# Patient Record
Sex: FEMALE | Race: WHITE | NOT HISPANIC OR LATINO | Employment: UNEMPLOYED | URBAN - METROPOLITAN AREA
[De-identification: names, ages, dates, MRNs, and addresses within clinical notes are randomized per-mention and may not be internally consistent; named-entity substitution may affect disease eponyms.]

---

## 2017-12-18 ENCOUNTER — HOSPITAL ENCOUNTER (EMERGENCY)
Facility: HOSPITAL | Age: 57
Discharge: HOME | End: 2017-12-18
Attending: EMERGENCY MEDICINE
Payer: MEDICAID

## 2017-12-18 VITALS
DIASTOLIC BLOOD PRESSURE: 83 MMHG | BODY MASS INDEX: 23.39 KG/M2 | HEART RATE: 97 BPM | TEMPERATURE: 97 F | HEIGHT: 63 IN | WEIGHT: 132 LBS | RESPIRATION RATE: 16 BRPM | SYSTOLIC BLOOD PRESSURE: 140 MMHG | OXYGEN SATURATION: 100 %

## 2017-12-18 DIAGNOSIS — J40 BRONCHITIS: Primary | ICD-10-CM

## 2017-12-18 PROCEDURE — 99283 EMERGENCY DEPT VISIT LOW MDM: CPT

## 2017-12-18 RX ORDER — ROPINIROLE 1 MG/1
1 TABLET, FILM COATED ORAL 2 TIMES DAILY
COMMUNITY
End: 2020-09-08

## 2017-12-18 RX ORDER — AZITHROMYCIN 250 MG/1
TABLET, FILM COATED ORAL
Status: DISCONTINUED
Start: 2017-12-18 | End: 2017-12-19 | Stop reason: HOSPADM

## 2017-12-18 RX ORDER — FEXOFENADINE HCL 60 MG
60 TABLET ORAL DAILY
COMMUNITY

## 2017-12-18 RX ORDER — PANTOPRAZOLE SODIUM 40 MG/1
40 TABLET, DELAYED RELEASE ORAL DAILY
COMMUNITY

## 2017-12-18 RX ORDER — NITROFURANTOIN (MACROCRYSTALS) 100 MG/1
100 CAPSULE ORAL EVERY 12 HOURS
COMMUNITY
End: 2020-09-08

## 2017-12-18 RX ORDER — GUAIFENESIN 600 MG/1
1200 TABLET, EXTENDED RELEASE ORAL 2 TIMES DAILY
COMMUNITY
End: 2023-05-04

## 2017-12-19 NOTE — ED PROVIDER NOTES
Encounter Date: 12/18/2017       History     Chief Complaint   Patient presents with    Nasal Congestion     with a cough and a sore throat for 6 days     The history is provided by the patient.   Cough   This is a new problem. The current episode started several days ago. The problem has been unchanged. The cough is productive of sputum. There has been no fever. Pertinent negatives include no chest pain, no chills, no sweats, no weight loss, no ear congestion, no ear pain, no headaches, no rhinorrhea, no sore throat, no myalgias, no shortness of breath, no wheezing and no eye redness. She has tried nothing for the symptoms. She is a smoker. Her past medical history does not include emphysema or asthma.     Review of patient's allergies indicates:   Allergen Reactions    Aspirin     Pcn [penicillins]     Sulfa (sulfonamide antibiotics)      Past Medical History:   Diagnosis Date    Anxiety     Cancer     Epilepsy     Hypertension     Renal disorder     Seizures      Past Surgical History:   Procedure Laterality Date    APPENDECTOMY      HYSTERECTOMY       No family history on file.  Social History   Substance Use Topics    Smoking status: Current Every Day Smoker     Packs/day: 2.00     Types: Cigarettes    Smokeless tobacco: Not on file    Alcohol use No     Review of Systems   Constitutional: Negative for chills and weight loss.   HENT: Negative for ear pain, rhinorrhea and sore throat.    Eyes: Negative for redness.   Respiratory: Positive for cough. Negative for shortness of breath and wheezing.    Cardiovascular: Negative for chest pain.   Gastrointestinal: Negative for abdominal pain, nausea and vomiting.   Genitourinary: Negative for enuresis and flank pain.   Musculoskeletal: Negative for myalgias, neck pain and neck stiffness.   Skin: Negative for color change, rash and wound.   Neurological: Negative for tremors, syncope, weakness and headaches.       Physical Exam     Initial Vitals  [12/18/17 1940]   BP Pulse Resp Temp SpO2   (!) 140/83 97 16 97 °F (36.1 °C) 100 %      MAP       102         Physical Exam    Nursing note and vitals reviewed.  Constitutional: She appears well-developed and well-nourished. She is not diaphoretic. No distress.   HENT:   Head: Atraumatic.   Mouth/Throat: No oropharyngeal exudate.   Eyes: EOM are normal. Pupils are equal, round, and reactive to light.   Neck: Normal range of motion. Neck supple. No JVD present.   Pulmonary/Chest: Breath sounds normal. No stridor. No respiratory distress. She has no wheezes. She has no rhonchi. She has no rales.   Abdominal: Soft. Bowel sounds are normal. She exhibits no distension. There is no tenderness. There is no rebound and no guarding.   Musculoskeletal: Normal range of motion. She exhibits no edema or tenderness.   Neurological: She is alert and oriented to person, place, and time. No sensory deficit.   Skin: No rash noted.         ED Course   Procedures  Labs Reviewed - No data to display                            ED Course      Clinical Impression:   The encounter diagnosis was Bronchitis.    Disposition:   Disposition: Discharged  Condition: Stable                        Yan Ervin MD  12/19/17 0049

## 2017-12-19 NOTE — ED TRIAGE NOTES
57 y.o. female presents to ER ED 02/ED 02B   Chief Complaint   Patient presents with    Nasal Congestion     with a cough and a sore throat for 6 days   . No acute distress noted.

## 2019-01-04 ENCOUNTER — HOSPITAL ENCOUNTER (OUTPATIENT)
Dept: SLEEP MEDICINE | Facility: HOSPITAL | Age: 59
Discharge: HOME | End: 2019-01-04
Attending: FAMILY MEDICINE
Payer: MEDICARE

## 2019-01-04 DIAGNOSIS — G47.9 REPETITIVE INTRUSIONS OF SLEEP: ICD-10-CM

## 2019-01-04 PROCEDURE — 95810 POLYSOM 6/> YRS 4/> PARAM: CPT

## 2019-02-02 ENCOUNTER — HOSPITAL ENCOUNTER (OUTPATIENT)
Dept: SLEEP MEDICINE | Facility: HOSPITAL | Age: 59
Discharge: HOME | End: 2019-02-02
Attending: FAMILY MEDICINE
Payer: MEDICARE

## 2019-02-02 DIAGNOSIS — G47.9 REPETITIVE INTRUSIONS OF SLEEP: ICD-10-CM

## 2019-02-02 PROCEDURE — 95811 POLYSOM 6/>YRS CPAP 4/> PARM: CPT

## 2019-10-26 ENCOUNTER — NURSE TRIAGE (OUTPATIENT)
Dept: ADMINISTRATIVE | Facility: CLINIC | Age: 59
End: 2019-10-26

## 2019-10-26 NOTE — TELEPHONE ENCOUNTER
Reason for Disposition   Sounds like a life-threatening emergency to the triager    Additional Information   Negative: Difficult to awaken or acting confused (e.g., disoriented, slurred speech)   Negative: Shock suspected (e.g., cold/pale/clammy skin, too weak to stand, low BP, rapid pulse)    Protocols used: DIARRHEA-A-  LM at 337pm at  [phone]  Sick since 10/23 with V/D, weak, cant hold down anything. feeling like about to pass out. abd cramps, [id number]. rec EMS. Pt agrees. Call back with questions

## 2020-09-08 ENCOUNTER — OFFICE VISIT (OUTPATIENT)
Dept: NEUROLOGY | Facility: CLINIC | Age: 60
End: 2020-09-08
Payer: MEDICARE

## 2020-09-08 VITALS
WEIGHT: 112.63 LBS | RESPIRATION RATE: 16 BRPM | TEMPERATURE: 98 F | BODY MASS INDEX: 19.96 KG/M2 | HEART RATE: 85 BPM | DIASTOLIC BLOOD PRESSURE: 70 MMHG | SYSTOLIC BLOOD PRESSURE: 122 MMHG | HEIGHT: 63 IN

## 2020-09-08 DIAGNOSIS — I10 ESSENTIAL HYPERTENSION: ICD-10-CM

## 2020-09-08 DIAGNOSIS — R90.82 WHITE MATTER ABNORMALITY ON MRI OF BRAIN: Primary | ICD-10-CM

## 2020-09-08 DIAGNOSIS — R41.3 MEMORY LOSS: ICD-10-CM

## 2020-09-08 DIAGNOSIS — F17.200 SMOKER: ICD-10-CM

## 2020-09-08 DIAGNOSIS — Z86.69 HISTORY OF SEIZURES AS A CHILD: ICD-10-CM

## 2020-09-08 PROCEDURE — 99999 PR STA SHADOW: CPT | Mod: PBBFAC,,, | Performed by: PSYCHIATRY & NEUROLOGY

## 2020-09-08 PROCEDURE — 99999 PR PBB SHADOW E&M-EST. PATIENT-LVL IV: CPT | Mod: PBBFAC,,, | Performed by: PSYCHIATRY & NEUROLOGY

## 2020-09-08 PROCEDURE — 99999 PR STA SHADOW: ICD-10-PCS | Mod: PBBFAC,,, | Performed by: PSYCHIATRY & NEUROLOGY

## 2020-09-08 PROCEDURE — 99204 OFFICE O/P NEW MOD 45 MIN: CPT | Mod: S$PBB | Performed by: PSYCHIATRY & NEUROLOGY

## 2020-09-08 PROCEDURE — 99214 OFFICE O/P EST MOD 30 MIN: CPT | Mod: PBBFAC | Performed by: PSYCHIATRY & NEUROLOGY

## 2020-09-08 RX ORDER — MELOXICAM 15 MG/1
15 TABLET ORAL DAILY PRN
COMMUNITY
End: 2023-05-04

## 2020-09-08 RX ORDER — AMITRIPTYLINE HYDROCHLORIDE 25 MG/1
25 TABLET, FILM COATED ORAL NIGHTLY PRN
COMMUNITY
End: 2023-05-04

## 2020-09-08 RX ORDER — AMLODIPINE BESYLATE 5 MG/1
5 TABLET ORAL NIGHTLY
COMMUNITY

## 2020-09-08 RX ORDER — CYCLOBENZAPRINE HCL 10 MG
10 TABLET ORAL 3 TIMES DAILY PRN
COMMUNITY
End: 2023-05-04

## 2020-09-08 RX ORDER — CYCLOSPORINE 0.5 MG/ML
1 EMULSION OPHTHALMIC 2 TIMES DAILY
COMMUNITY

## 2020-09-08 RX ORDER — LISINOPRIL 40 MG/1
40 TABLET ORAL DAILY
COMMUNITY

## 2020-09-08 RX ORDER — ERYTHROMYCIN 5 MG/G
1 OINTMENT OPHTHALMIC 3 TIMES DAILY
COMMUNITY

## 2020-09-08 NOTE — PROGRESS NOTES
"Consult from Dr Frankel    HPI: Melanie Armenta is a 59 y.o. female who states she is here to inquire about " If I really need to be on these blood thinners."  By this she means Plavix.  She is not NOAC, coumaind.  She was placed no Plavix after dizziness episode in 2014 where chronic white matter ischemic changes. She has an ASA allergy.  She does not like bruising associated with Plavix- she could not finish a tatoo due to bleeding.    She states her memory is not bad, but notes some mild forgetfulness non daily (not even weekly) and not very bothersome. 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 PT was recommended at that time along with smoking cessation and repeat MRI brain to monitor white matter changes but she failed to follow up    Vertigo never recurred after PT in 2014    History of childhood seizure but none since     Smoker still but is tapering.   Had stress test prior, no abnormalities (chronic chest pain from anxiety noted)      Review of Systems   Constitutional: Negative for fever.   HENT: Negative for nosebleeds.    Eyes: Negative for double vision.   Respiratory: Negative for hemoptysis.    Cardiovascular: Negative for leg swelling.   Gastrointestinal: Negative for blood in stool.   Genitourinary: Negative for hematuria.   Musculoskeletal: Negative for falls.   Skin: Negative for rash.   Neurological: Negative for seizures.   Psychiatric/Behavioral: Negative for depression. The patient does not have insomnia.          I have reviewed all of this patient's past medical and surgical histories as well as family and social histories and active allergies and medications as documented in the electronic medical record.        Exam:  Gen Appearance, well developed/nourished in no apparent distress  CV: 2+ distal pulses with no " edema or swelling  Neuro:  MS: Awake, alert, oriented to place, person, time, situation. Sustains attention. Recent/remote memory intact, Language is full to spontaneous speech/repetition/naming/comprehension. Fund of Knowledge is full  CN: Optic discs are flat with normal vasculature, PERRL, Extraoccular movements and visual fields are full. Normal facial sensation and strength, Hearing symmetric, Tongue and Palate are midline and strong. Shoulder Shrug symmetric and strong.  Motor: Normal bulk, tone, no abnormal movements. 5/5 strength bilateral upper/lower extremities with 2+ reflexes and bilateral plantar response  Sensory: symmetric to light touch, pain, temp, and vibration/proprioception. Romberg negative  Cerebellar: Finger-nose,Heal-shin, Rapid alternating movements intact  Gait: Normal stance, no ataxia  Minimal bruising on exam    Imagin MRI brain: Multiple punctate foci of T2/flair signal abnormality seen throughout the supratentorial white matter without corresponding diffusion were strictured or postcontrast enhancement.  This finding is greater than expected for the patient's age.  Primary   differential considerations would include chronic microvascular ischemic change.  Demyelinating disease process cannot be entirely excluded.  If prior imaging is available, it can be submitted for comparison purposes to determine the stability of these   findings.  Alternatively, consider a short term interval follow up examination.   2014 MRA brainNo evidence for focal stenosis or aneurysm.     Diminutive caliber of the bilateral vertebral arteries and basilar artery with essentially fetal circulation involving the bilateral posterior cerebral arteries.  Consideration for the possibility of vertebrobasilar syndrome in this patient with   dizziness..         Assessment/Plan: Melanie Armenta is a 59 y.o. female known to me in 2014 for vertigo and incidentally found chronic white matter ischemic changes by MRI  "brain     I recommend:   1. 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 PT was recommended at that time along with smoking cessation and repeat MRI brain to monitor white matter changes but she failed to follow up. No need for follow up at this time as this was incidentally found and patient does not have any symptoms referred to these changes over many years which are likely related to smoking, HTN.   -She reportedly has an ASA allergy  -continue plavix given her smoking (she is trying to taper) and HTN and white matter changes (brusing is minimal, benefits are more than risks)  -Vertigo never recurred after PT in 2014    2. Noted  childhood history of reportedly "petite mal" seizures /none since age 18.     3. Reporting rare memory loss likely age related. No further work up or treatment needed at this time unless work  (RTC if so)    RTC PRN  CC:   Dr Frankel          "

## 2021-09-28 ENCOUNTER — HOSPITAL ENCOUNTER (EMERGENCY)
Facility: HOSPITAL | Age: 61
Discharge: HOME | End: 2021-09-28
Attending: SURGERY
Payer: MEDICARE

## 2021-09-28 VITALS
HEART RATE: 105 BPM | TEMPERATURE: 98 F | OXYGEN SATURATION: 97 % | RESPIRATION RATE: 16 BRPM | WEIGHT: 127 LBS | SYSTOLIC BLOOD PRESSURE: 140 MMHG | HEIGHT: 63 IN | DIASTOLIC BLOOD PRESSURE: 72 MMHG | BODY MASS INDEX: 22.5 KG/M2

## 2021-09-28 DIAGNOSIS — K29.70 GASTRITIS, PRESENCE OF BLEEDING UNSPECIFIED, UNSPECIFIED CHRONICITY, UNSPECIFIED GASTRITIS TYPE: Primary | ICD-10-CM

## 2021-09-28 DIAGNOSIS — R10.13 EPIGASTRIC ABDOMINAL PAIN: ICD-10-CM

## 2021-09-28 DIAGNOSIS — R10.9 ABDOMINAL PAIN: ICD-10-CM

## 2021-09-28 LAB
ALBUMIN SERPL BCP-MCNC: 3.9 G/DL (ref 3.5–5.2)
ALP SERPL-CCNC: 94 U/L (ref 55–135)
ALT SERPL W/O P-5'-P-CCNC: 12 U/L (ref 10–44)
ANION GAP SERPL CALC-SCNC: 10 MMOL/L (ref 8–16)
AST SERPL-CCNC: 13 U/L (ref 10–40)
BACTERIA #/AREA URNS HPF: ABNORMAL /HPF
BASOPHILS # BLD AUTO: 0.08 K/UL (ref 0–0.2)
BASOPHILS NFR BLD: 0.7 % (ref 0–1.9)
BILIRUB SERPL-MCNC: 0.7 MG/DL (ref 0.1–1)
BILIRUB UR QL STRIP: NEGATIVE
BUN SERPL-MCNC: 11 MG/DL (ref 8–23)
CALCIUM SERPL-MCNC: 9.8 MG/DL (ref 8.7–10.5)
CHLORIDE SERPL-SCNC: 107 MMOL/L (ref 95–110)
CK MB SERPL-MCNC: 0.5 NG/ML (ref 0.1–6.5)
CK MB SERPL-RTO: 1.1 % (ref 0–5)
CK SERPL-CCNC: 45 U/L (ref 20–180)
CK SERPL-CCNC: 45 U/L (ref 20–180)
CLARITY UR: CLEAR
CO2 SERPL-SCNC: 24 MMOL/L (ref 23–29)
COLOR UR: YELLOW
CREAT SERPL-MCNC: 0.9 MG/DL (ref 0.5–1.4)
DIFFERENTIAL METHOD: NORMAL
EOSINOPHIL # BLD AUTO: 0.2 K/UL (ref 0–0.5)
EOSINOPHIL NFR BLD: 1.9 % (ref 0–8)
ERYTHROCYTE [DISTWIDTH] IN BLOOD BY AUTOMATED COUNT: 13.2 % (ref 11.5–14.5)
EST. GFR  (AFRICAN AMERICAN): >60 ML/MIN/1.73 M^2
EST. GFR  (NON AFRICAN AMERICAN): >60 ML/MIN/1.73 M^2
GLUCOSE SERPL-MCNC: 110 MG/DL (ref 70–110)
GLUCOSE UR QL STRIP: NEGATIVE
HCT VFR BLD AUTO: 43.1 % (ref 37–48.5)
HGB BLD-MCNC: 14.1 G/DL (ref 12–16)
HGB UR QL STRIP: NEGATIVE
IMM GRANULOCYTES # BLD AUTO: 0.03 K/UL (ref 0–0.04)
IMM GRANULOCYTES NFR BLD AUTO: 0.3 % (ref 0–0.5)
KETONES UR QL STRIP: NEGATIVE
LEUKOCYTE ESTERASE UR QL STRIP: ABNORMAL
LIPASE SERPL-CCNC: 11 U/L (ref 4–60)
LYMPHOCYTES # BLD AUTO: 3.6 K/UL (ref 1–4.8)
LYMPHOCYTES NFR BLD: 32.8 % (ref 18–48)
MCH RBC QN AUTO: 29.6 PG (ref 27–31)
MCHC RBC AUTO-ENTMCNC: 32.7 G/DL (ref 32–36)
MCV RBC AUTO: 90 FL (ref 82–98)
MICROSCOPIC COMMENT: ABNORMAL
MONOCYTES # BLD AUTO: 0.8 K/UL (ref 0.3–1)
MONOCYTES NFR BLD: 7.1 % (ref 4–15)
NEUTROPHILS # BLD AUTO: 6.2 K/UL (ref 1.8–7.7)
NEUTROPHILS NFR BLD: 57.2 % (ref 38–73)
NITRITE UR QL STRIP: NEGATIVE
NRBC BLD-RTO: 0 /100 WBC
PH UR STRIP: 5 [PH] (ref 5–8)
PLATELET # BLD AUTO: 340 K/UL (ref 150–450)
PMV BLD AUTO: 9.3 FL (ref 9.2–12.9)
POTASSIUM SERPL-SCNC: 4.1 MMOL/L (ref 3.5–5.1)
PROT SERPL-MCNC: 8.4 G/DL (ref 6–8.4)
PROT UR QL STRIP: NEGATIVE
RBC # BLD AUTO: 4.77 M/UL (ref 4–5.4)
RBC #/AREA URNS HPF: 2 /HPF (ref 0–4)
SODIUM SERPL-SCNC: 141 MMOL/L (ref 136–145)
SP GR UR STRIP: >=1.03 (ref 1–1.03)
SQUAMOUS #/AREA URNS HPF: 4 /HPF
TROPONIN I SERPL DL<=0.01 NG/ML-MCNC: <0.006 NG/ML (ref 0–0.03)
URN SPEC COLLECT METH UR: ABNORMAL
UROBILINOGEN UR STRIP-ACNC: NEGATIVE EU/DL
WBC # BLD AUTO: 10.84 K/UL (ref 3.9–12.7)
WBC #/AREA URNS HPF: 4 /HPF (ref 0–5)

## 2021-09-28 PROCEDURE — 82553 CREATINE MB FRACTION: CPT | Performed by: SURGERY

## 2021-09-28 PROCEDURE — 99284 EMERGENCY DEPT VISIT MOD MDM: CPT | Mod: 25

## 2021-09-28 PROCEDURE — 25000003 PHARM REV CODE 250: Performed by: SURGERY

## 2021-09-28 PROCEDURE — 36415 COLL VENOUS BLD VENIPUNCTURE: CPT | Performed by: SURGERY

## 2021-09-28 PROCEDURE — 80053 COMPREHEN METABOLIC PANEL: CPT | Performed by: SURGERY

## 2021-09-28 PROCEDURE — 81000 URINALYSIS NONAUTO W/SCOPE: CPT | Performed by: SURGERY

## 2021-09-28 PROCEDURE — 93005 ELECTROCARDIOGRAM TRACING: CPT

## 2021-09-28 PROCEDURE — 25500020 PHARM REV CODE 255: Performed by: SURGERY

## 2021-09-28 PROCEDURE — 96361 HYDRATE IV INFUSION ADD-ON: CPT

## 2021-09-28 PROCEDURE — 93010 ELECTROCARDIOGRAM REPORT: CPT | Mod: ,,, | Performed by: INTERNAL MEDICINE

## 2021-09-28 PROCEDURE — 85025 COMPLETE CBC W/AUTO DIFF WBC: CPT | Performed by: SURGERY

## 2021-09-28 PROCEDURE — 93010 EKG 12-LEAD: ICD-10-PCS | Mod: ,,, | Performed by: INTERNAL MEDICINE

## 2021-09-28 PROCEDURE — 96360 HYDRATION IV INFUSION INIT: CPT

## 2021-09-28 PROCEDURE — 83690 ASSAY OF LIPASE: CPT | Performed by: SURGERY

## 2021-09-28 PROCEDURE — 84484 ASSAY OF TROPONIN QUANT: CPT | Performed by: SURGERY

## 2021-09-28 RX ORDER — ONDANSETRON 4 MG/1
4 TABLET, ORALLY DISINTEGRATING ORAL EVERY 8 HOURS PRN
Qty: 9 TABLET | Refills: 0 | Status: SHIPPED | OUTPATIENT
Start: 2021-09-28 | End: 2021-10-01

## 2021-09-28 RX ORDER — SUCRALFATE 1 G/1
1 TABLET ORAL
Qty: 120 TABLET | Refills: 0 | Status: SHIPPED | OUTPATIENT
Start: 2021-09-28 | End: 2023-05-04

## 2021-09-28 RX ADMIN — IOHEXOL 75 ML: 350 INJECTION, SOLUTION INTRAVENOUS at 02:09

## 2021-09-28 RX ADMIN — SODIUM CHLORIDE 1000 ML: 0.9 INJECTION, SOLUTION INTRAVENOUS at 02:09

## 2021-09-28 RX ADMIN — SODIUM CHLORIDE 1000 ML: 0.9 INJECTION, SOLUTION INTRAVENOUS at 01:09

## 2023-02-25 ENCOUNTER — HOSPITAL ENCOUNTER (EMERGENCY)
Facility: HOSPITAL | Age: 63
Discharge: HOME | End: 2023-02-25
Attending: SURGERY
Payer: MEDICARE

## 2023-02-25 VITALS
BODY MASS INDEX: 21.6 KG/M2 | DIASTOLIC BLOOD PRESSURE: 70 MMHG | SYSTOLIC BLOOD PRESSURE: 142 MMHG | OXYGEN SATURATION: 96 % | RESPIRATION RATE: 16 BRPM | TEMPERATURE: 98 F | WEIGHT: 121.94 LBS | HEART RATE: 103 BPM

## 2023-02-25 DIAGNOSIS — L02.91 ABSCESS: Primary | ICD-10-CM

## 2023-02-25 DIAGNOSIS — R50.9 FEVER: ICD-10-CM

## 2023-02-25 LAB
ALBUMIN SERPL BCP-MCNC: 4.3 G/DL (ref 3.5–5.2)
ALP SERPL-CCNC: 104 U/L (ref 55–135)
ALT SERPL W/O P-5'-P-CCNC: 10 U/L (ref 10–44)
ANION GAP SERPL CALC-SCNC: 12 MMOL/L (ref 8–16)
AST SERPL-CCNC: 14 U/L (ref 10–40)
BASOPHILS # BLD AUTO: 0.05 K/UL (ref 0–0.2)
BASOPHILS NFR BLD: 0.5 % (ref 0–1.9)
BILIRUB SERPL-MCNC: 0.7 MG/DL (ref 0.1–1)
BILIRUB UR QL STRIP: NEGATIVE
BUN SERPL-MCNC: 11 MG/DL (ref 8–23)
CALCIUM SERPL-MCNC: 10.6 MG/DL (ref 8.7–10.5)
CHLORIDE SERPL-SCNC: 103 MMOL/L (ref 95–110)
CLARITY UR: CLEAR
CO2 SERPL-SCNC: 27 MMOL/L (ref 23–29)
COLOR UR: YELLOW
CREAT SERPL-MCNC: 0.9 MG/DL (ref 0.5–1.4)
DIFFERENTIAL METHOD: ABNORMAL
EOSINOPHIL # BLD AUTO: 0.1 K/UL (ref 0–0.5)
EOSINOPHIL NFR BLD: 1 % (ref 0–8)
ERYTHROCYTE [DISTWIDTH] IN BLOOD BY AUTOMATED COUNT: 12.9 % (ref 11.5–14.5)
EST. GFR  (NO RACE VARIABLE): >60 ML/MIN/1.73 M^2
GLUCOSE SERPL-MCNC: 102 MG/DL (ref 70–110)
GLUCOSE UR QL STRIP: NEGATIVE
HCT VFR BLD AUTO: 44.2 % (ref 37–48.5)
HGB BLD-MCNC: 14.3 G/DL (ref 12–16)
HGB UR QL STRIP: ABNORMAL
IMM GRANULOCYTES # BLD AUTO: 0.03 K/UL (ref 0–0.04)
IMM GRANULOCYTES NFR BLD AUTO: 0.3 % (ref 0–0.5)
KETONES UR QL STRIP: NEGATIVE
LACTATE SERPL-SCNC: 0.9 MMOL/L (ref 0.5–2.2)
LEUKOCYTE ESTERASE UR QL STRIP: NEGATIVE
LYMPHOCYTES # BLD AUTO: 2.6 K/UL (ref 1–4.8)
LYMPHOCYTES NFR BLD: 24.8 % (ref 18–48)
MCH RBC QN AUTO: 29.5 PG (ref 27–31)
MCHC RBC AUTO-ENTMCNC: 32.4 G/DL (ref 32–36)
MCV RBC AUTO: 91 FL (ref 82–98)
MONOCYTES # BLD AUTO: 0.9 K/UL (ref 0.3–1)
MONOCYTES NFR BLD: 8.3 % (ref 4–15)
NEUTROPHILS # BLD AUTO: 6.9 K/UL (ref 1.8–7.7)
NEUTROPHILS NFR BLD: 65.1 % (ref 38–73)
NITRITE UR QL STRIP: NEGATIVE
NRBC BLD-RTO: 0 /100 WBC
PH UR STRIP: 7 [PH] (ref 5–8)
PLATELET # BLD AUTO: 291 K/UL (ref 150–450)
PMV BLD AUTO: 8.9 FL (ref 9.2–12.9)
POTASSIUM SERPL-SCNC: 4.2 MMOL/L (ref 3.5–5.1)
PROT SERPL-MCNC: 8.4 G/DL (ref 6–8.4)
PROT UR QL STRIP: NEGATIVE
RBC # BLD AUTO: 4.84 M/UL (ref 4–5.4)
SODIUM SERPL-SCNC: 142 MMOL/L (ref 136–145)
SP GR UR STRIP: 1.01 (ref 1–1.03)
TROPONIN I SERPL DL<=0.01 NG/ML-MCNC: <0.006 NG/ML (ref 0–0.03)
URN SPEC COLLECT METH UR: ABNORMAL
UROBILINOGEN UR STRIP-ACNC: NEGATIVE EU/DL
WBC # BLD AUTO: 10.62 K/UL (ref 3.9–12.7)

## 2023-02-25 PROCEDURE — 96375 TX/PRO/DX INJ NEW DRUG ADDON: CPT

## 2023-02-25 PROCEDURE — 87040 BLOOD CULTURE FOR BACTERIA: CPT | Mod: 59

## 2023-02-25 PROCEDURE — 93010 ELECTROCARDIOGRAM REPORT: CPT | Mod: ,,, | Performed by: INTERNAL MEDICINE

## 2023-02-25 PROCEDURE — 93010 EKG 12-LEAD: ICD-10-PCS | Mod: ,,, | Performed by: INTERNAL MEDICINE

## 2023-02-25 PROCEDURE — 80053 COMPREHEN METABOLIC PANEL: CPT

## 2023-02-25 PROCEDURE — 67700 BLEPHAROTOMY DRG ABSC EYELID: CPT | Mod: E3

## 2023-02-25 PROCEDURE — 81003 URINALYSIS AUTO W/O SCOPE: CPT

## 2023-02-25 PROCEDURE — 25000003 PHARM REV CODE 250

## 2023-02-25 PROCEDURE — 10060 I&D ABSCESS SIMPLE/SINGLE: CPT

## 2023-02-25 PROCEDURE — 99285 EMERGENCY DEPT VISIT HI MDM: CPT | Mod: 25

## 2023-02-25 PROCEDURE — 83605 ASSAY OF LACTIC ACID: CPT

## 2023-02-25 PROCEDURE — 85025 COMPLETE CBC W/AUTO DIFF WBC: CPT

## 2023-02-25 PROCEDURE — 96365 THER/PROPH/DIAG IV INF INIT: CPT | Mod: 59

## 2023-02-25 PROCEDURE — 84484 ASSAY OF TROPONIN QUANT: CPT

## 2023-02-25 PROCEDURE — 25500020 PHARM REV CODE 255: Performed by: SURGERY

## 2023-02-25 PROCEDURE — 63600175 PHARM REV CODE 636 W HCPCS

## 2023-02-25 PROCEDURE — 93005 ELECTROCARDIOGRAM TRACING: CPT

## 2023-02-25 RX ORDER — MORPHINE SULFATE 2 MG/ML
4 INJECTION, SOLUTION INTRAMUSCULAR; INTRAVENOUS
Status: COMPLETED | OUTPATIENT
Start: 2023-02-25 | End: 2023-02-25

## 2023-02-25 RX ORDER — LIDOCAINE HYDROCHLORIDE 10 MG/ML
5 INJECTION, SOLUTION EPIDURAL; INFILTRATION; INTRACAUDAL; PERINEURAL
Status: COMPLETED | OUTPATIENT
Start: 2023-02-25 | End: 2023-02-25

## 2023-02-25 RX ADMIN — LIDOCAINE HYDROCHLORIDE 50 MG: 10 INJECTION, SOLUTION EPIDURAL; INFILTRATION; INTRACAUDAL; PERINEURAL at 03:02

## 2023-02-25 RX ADMIN — IOHEXOL 75 ML: 350 INJECTION, SOLUTION INTRAVENOUS at 01:02

## 2023-02-25 RX ADMIN — MORPHINE SULFATE 4 MG: 2 INJECTION, SOLUTION INTRAMUSCULAR; INTRAVENOUS at 03:02

## 2023-02-25 RX ADMIN — PROMETHAZINE HYDROCHLORIDE 12.5 MG: 25 INJECTION INTRAMUSCULAR; INTRAVENOUS at 03:02

## 2023-02-25 NOTE — ED TRIAGE NOTES
62 y.o. female presents to ER Room/bed info not found   Chief Complaint   Patient presents with    Eye Pain     R eye - pt reports she was seen by ZOHRA Turk @ [facility]. Dx with Acute Dacryocystitis  Pt states she was advised to come to ED to be evaluated. States it was drained yesterday. Currently taking Doxy for abx   Pt reports fever 101 at 10:30. Pt took tylenol for fever.  No acute distress noted.

## 2023-02-25 NOTE — ED PROVIDER NOTES
Encounter Date: 2/25/2023       History     Chief Complaint   Patient presents with    Eye Pain     R eye - pt reports she was seen by ZOHRA Turk @ [facility].  with Acute Dacryocystitis  Pt states she was advised to come to ED to be evaluated. States it was drained yesterday. Currently taking Doxy for abx     This note is dictated on M*Modal word recognition program.  There are word recognition mistakes and grammatical errors that are occasionally missed on review.     Melanie Armenta is a 62 y.o. female presents to ER today with complaints of orbital cellulitis to her right eye  She saw eye doctor at [facility] yesterday.  Patient reports she is been running fever on and off for the last 4 days.  She reports her eye doctor advised her to come to ER to make sure she is not septic.  Patient also reports a mild cough and has some chest pressure over the last week.  Patient has a plan to follow-up with the eye specialist in [place] next week to have her procedure done to help clear up this orbital cellulitis.  Patient reports the abscess to her right eye was drained yesterday by eye doctor however filled back up overnight    The history is provided by the patient.   Review of patient's allergies indicates:   Allergen Reactions    Aspirin     Azithromycin     Cephalexin Other (See Comments)    Clonazepam Other (See Comments)    Gabapentin     Hydrocodone-acetaminophen Other (See Comments)    Pcn [penicillins]     Pramipexole     Ropinirole Other (See Comments) and Hallucinations    Sertraline     Sulfa (sulfonamide antibiotics)     Tramadol-acetaminophen     Ciprofloxacin Nausea Only     Past Medical History:   Diagnosis Date    Anxiety     Cancer     Cataract     bilateral     COVID-19     Epilepsy     Hypertension     Renal disorder     Seizures      Past Surgical History:   Procedure Laterality Date    APPENDECTOMY      HYSTERECTOMY       History reviewed. No pertinent family history.  Social  History     Tobacco Use    Smoking status: Every Day     Packs/day: 2.00     Types: Cigarettes   Substance Use Topics    Alcohol use: No    Drug use: No     Review of Systems   Constitutional:  Positive for fatigue and fever.   HENT: Negative.     Eyes:  Positive for visual disturbance (Patient reports mild blurred vision to right eye.).        Patient has periorbital abscess to corner of right eye/bridge of nose.   Respiratory:  Positive for cough and chest tightness.    Cardiovascular: Negative.    Gastrointestinal: Negative.    Endocrine: Negative.    Genitourinary:  Positive for difficulty urinating.   Psychiatric/Behavioral: Negative.       Physical Exam     Initial Vitals [02/25/23 1122]   BP Pulse Resp Temp SpO2   (!) 142/70 103 20 97.9 °F (36.6 °C) 96 %      MAP       --         Physical Exam    Constitutional: She appears well-developed and well-nourished.   HENT:   Head: Normocephalic and atraumatic.   Right Ear: External ear normal.   Left Ear: External ear normal.   Eyes: Pupils are equal, round, and reactive to light. Right eye exhibits discharge (Patient has abscess to corner of right eye near nasal bridge that is erythemic, and indurated). Left eye exhibits no discharge.   Patient has abscess to corner of right eye   Neck: Neck supple.   Normal range of motion.  Cardiovascular:  Normal rate.           Pulmonary/Chest: Breath sounds normal. No respiratory distress. She has no wheezes.   Abdominal: Abdomen is soft. She exhibits no distension.   Musculoskeletal:         General: No tenderness or edema. Normal range of motion.      Cervical back: Normal range of motion and neck supple.     Neurological: She is oriented to person, place, and time. She displays normal reflexes. No cranial nerve deficit. GCS score is 15. GCS eye subscore is 4. GCS verbal subscore is 5. GCS motor subscore is 6.   Skin: Capillary refill takes less than 2 seconds. Abscess noted. There is erythema.   Psychiatric: She has a  normal mood and affect.         ED Course   I & D - Incision and Drainage    Date/Time: 2/25/2023 4:08 PM  Location procedure was performed: [facility] EMERGENCY DEPARTMENT  Performed by: Davie Jones NP  Authorized by: Davie Butcher MD   Type: abscess  Body area: head/neck (Abscess to superomedial aspect of right orbit)  Anesthesia: local infiltration    Anesthesia:  Local Anesthetic: lidocaine 1% without epinephrine  Scalpel size: 11  Incision type: single straight  Drainage: serosanguinous  Drainage amount: scant  Wound treatment: incision, expression of material and deloculation  Packing material: 1/4 in gauze  Complications: No  Specimens: No  Implants: No      Labs Reviewed   CBC W/ AUTO DIFFERENTIAL - Abnormal; Notable for the following components:       Result Value    MPV 8.9 (*)     All other components within normal limits   COMPREHENSIVE METABOLIC PANEL - Abnormal; Notable for the following components:    Calcium 10.6 (*)     All other components within normal limits   URINALYSIS, REFLEX TO URINE CULTURE - Abnormal; Notable for the following components:    Occult Blood UA Trace (*)     All other components within normal limits    Narrative:     Specimen Source->Urine   CULTURE, BLOOD   CULTURE, BLOOD   LACTIC ACID, PLASMA   TROPONIN I     EKG Readings: (Independently Interpreted)   Initial Reading: No STEMI. Rhythm: Normal Sinus Rhythm. Ectopy: No Ectopy. Axis: Normal. Clinical Impression: Normal Sinus Rhythm     Imaging Results              X-Ray Chest 1 View (Final result)  Result time 02/25/23 14:24:28      Final result by Guerita Blair MD (02/25/23 14:24:28)                   Impression:      No acute abnormality.      Electronically signed by: Guerita Blair  Date:    02/25/2023  Time:    14:24               Narrative:    EXAMINATION:  XR CHEST 1 VIEW    CLINICAL HISTORY:  Fever, unspecified    TECHNIQUE:  Single frontal view of the chest was  performed.    COMPARISON:  12/18/2017    FINDINGS:  Cardiomediastinal silhouette is within normal limits.  There is chronic scarring in the right mid lung and at the left lung base.  Lungs are otherwise clear.  There is no pleural effusion or pneumothorax.                                       CT Maxillofacial With Contrast (Final result)  Result time 02/25/23 14:23:12      Final result by Guerita Blair MD (02/25/23 14:23:12)                   Impression:      Superficial abscess in the superomedial aspect of the right orbit.    Mild sinus disease.      Electronically signed by: Guerita Blair  Date:    02/25/2023  Time:    14:23               Narrative:    EXAMINATION:  CT MAXILLOFACIAL WITH CONTRAST    CLINICAL HISTORY:  Maxillary/facial abscess;    TECHNIQUE:  Following IV administration of 75 cc of Omnipaque 350, CT of the orbits was performed.    COMPARISON:  Head CT 08/20/2014    FINDINGS:  At the supero medial aspect of the right orbit there is a superficial 15 x 14 x 24 mm fluid collection with rim enhancement compatible with an abscess.  This is extraconal and likely preseptal.  The retro bulbar soft tissues are normal in appearance.  The collection abuts the globe, but the globe is normal in appearance.    The visualized intracranial structures and the left orbit are normal.    No significant findings are noted in the visualized soft tissues of the neck.    There is mild mucosal thickening consistent with sinusitis in the right maxillary antrum, the left side of the frontal sinus and the anterior ethmoid air cells.    There is no bone destruction or facial bone fracture.                                       Medications   iohexoL (OMNIPAQUE 350) injection 75 mL (75 mLs Intravenous Given 2/25/23 1339)   morphine injection 4 mg (4 mg Intravenous Given 2/25/23 1506)   promethazine (PHENERGAN) 12.5 mg in dextrose 5 % (D5W) 50 mL IVPB (12.5 mg Intravenous New Bag 2/25/23 1513)   LIDOcaine (PF) 10  mg/ml (1%) injection 50 mg (50 mg Infiltration Given 2/25/23 4740)     Medical Decision Making:   Differential Diagnosis:   Orbital abscess, orbital cellulitis, sepsis  Clinical Tests:   Lab Tests: Ordered and Reviewed  Radiological Study: Ordered and Reviewed  Medical Tests: Ordered and Reviewed  ED Management:  CT maxillofacial shoulder superficial abscess to right eye.    CBC and CMP showed no acute metabolic derangement   Urinalysis reveals no acute urinary tract infection   Blood cultures are pending   Chest x-ray reveals no acute cardiopulmonary abnormality   Patient's shows no signs of being toxic or septic at this time.  I&D was performed to right eye abscess patient tolerated procedure well.  See procedure note.    Patient instructed to continue doxycycline and to follow-up with eye doctor as previously scheduled.  Patient stable at time of discharge in no acute distress.  No life-threatening illnesses were found during ER visit today.  Patient was instructed to follow-up with PCP or other recommended specialist within the next 48-72 hours.  Patient was instructed to return to ER immediately for any worsening or concerning symptoms.  All discharge instructions discussed with patient, and patient agrees to comply with discharge instructions given today.                         Clinical Impression:   Final diagnoses:  [R50.9] Fever  [L02.91] Abscess (Primary)        ED Disposition Condition    Discharge Stable          ED Prescriptions    None       Follow-up Information       Follow up With Specialties Details Why Contact Info    [facility]  Schedule an appointment as soon as possible for a visit in 2 days  [address]  [facility] [id number]             Davie Jones NP  02/25/23 [id number]

## 2023-03-02 LAB
BACTERIA BLD CULT: NORMAL
BACTERIA BLD CULT: NORMAL

## 2023-04-19 ENCOUNTER — TELEPHONE (OUTPATIENT)
Dept: NEUROLOGY | Facility: CLINIC | Age: 63
End: 2023-04-19
Payer: MEDICARE

## 2023-04-19 NOTE — TELEPHONE ENCOUNTER
----- Message from Francheska Albert sent at 2023  9:11 AM CDT -----  Contact: self  Melanie Armenta  MRN: [id number]  : 1960  PCP: Valery Howell  Home Phone      [phone]  Work Phone      Not on file.  Mobile          [phone]      MESSAGE: Have question and concern having surgery May 5th and asking if she can discontinue her clopidogrel until after her surgery    Phone [phone]

## 2023-12-02 ENCOUNTER — NURSE TRIAGE (OUTPATIENT)
Dept: ADMINISTRATIVE | Facility: CLINIC | Age: 63
End: 2023-12-02
Payer: MEDICARE

## 2023-12-02 ENCOUNTER — HOSPITAL ENCOUNTER (EMERGENCY)
Facility: HOSPITAL | Age: 63
Discharge: HOME | End: 2023-12-03
Attending: SURGERY
Payer: MEDICARE

## 2023-12-02 DIAGNOSIS — R10.9 FLANK PAIN: Primary | ICD-10-CM

## 2023-12-02 DIAGNOSIS — N30.00 ACUTE CYSTITIS WITHOUT HEMATURIA: ICD-10-CM

## 2023-12-02 LAB
ALBUMIN SERPL BCP-MCNC: 4.2 G/DL (ref 3.5–5.2)
ALP SERPL-CCNC: 89 U/L (ref 55–135)
ALT SERPL W/O P-5'-P-CCNC: 8 U/L (ref 10–44)
ANION GAP SERPL CALC-SCNC: 9 MMOL/L (ref 8–16)
AST SERPL-CCNC: 13 U/L (ref 10–40)
BACTERIA #/AREA URNS HPF: ABNORMAL /HPF
BASOPHILS # BLD AUTO: 0.06 K/UL (ref 0–0.2)
BASOPHILS NFR BLD: 0.5 % (ref 0–1.9)
BILIRUB SERPL-MCNC: 0.8 MG/DL (ref 0.1–1)
BILIRUB UR QL STRIP: NEGATIVE
BUN SERPL-MCNC: 16 MG/DL (ref 8–23)
CALCIUM SERPL-MCNC: 9.7 MG/DL (ref 8.7–10.5)
CHLORIDE SERPL-SCNC: 105 MMOL/L (ref 95–110)
CLARITY UR: CLEAR
CO2 SERPL-SCNC: 27 MMOL/L (ref 23–29)
COLOR UR: YELLOW
CREAT SERPL-MCNC: 1.7 MG/DL (ref 0.5–1.4)
DIFFERENTIAL METHOD: ABNORMAL
EOSINOPHIL # BLD AUTO: 0.2 K/UL (ref 0–0.5)
EOSINOPHIL NFR BLD: 1.5 % (ref 0–8)
ERYTHROCYTE [DISTWIDTH] IN BLOOD BY AUTOMATED COUNT: 12.9 % (ref 11.5–14.5)
EST. GFR  (NO RACE VARIABLE): 33 ML/MIN/1.73 M^2
GLUCOSE SERPL-MCNC: 105 MG/DL (ref 70–110)
GLUCOSE UR QL STRIP: NEGATIVE
HCT VFR BLD AUTO: 40 % (ref 37–48.5)
HGB BLD-MCNC: 13.1 G/DL (ref 12–16)
HGB UR QL STRIP: ABNORMAL
IMM GRANULOCYTES # BLD AUTO: 0.04 K/UL (ref 0–0.04)
IMM GRANULOCYTES NFR BLD AUTO: 0.3 % (ref 0–0.5)
KETONES UR QL STRIP: NEGATIVE
LACTATE SERPL-SCNC: 1 MMOL/L (ref 0.5–2.2)
LEUKOCYTE ESTERASE UR QL STRIP: ABNORMAL
LYMPHOCYTES # BLD AUTO: 3.7 K/UL (ref 1–4.8)
LYMPHOCYTES NFR BLD: 29.4 % (ref 18–48)
MCH RBC QN AUTO: 29.8 PG (ref 27–31)
MCHC RBC AUTO-ENTMCNC: 32.8 G/DL (ref 32–36)
MCV RBC AUTO: 91 FL (ref 82–98)
MICROSCOPIC COMMENT: ABNORMAL
MONOCYTES # BLD AUTO: 1.1 K/UL (ref 0.3–1)
MONOCYTES NFR BLD: 8.7 % (ref 4–15)
NEUTROPHILS # BLD AUTO: 7.5 K/UL (ref 1.8–7.7)
NEUTROPHILS NFR BLD: 59.6 % (ref 38–73)
NITRITE UR QL STRIP: NEGATIVE
NRBC BLD-RTO: 0 /100 WBC
PH UR STRIP: 6 [PH] (ref 5–8)
PLATELET # BLD AUTO: 265 K/UL (ref 150–450)
PMV BLD AUTO: 9.1 FL (ref 9.2–12.9)
POTASSIUM SERPL-SCNC: 3.6 MMOL/L (ref 3.5–5.1)
PROT SERPL-MCNC: 8.1 G/DL (ref 6–8.4)
PROT UR QL STRIP: NEGATIVE
RBC # BLD AUTO: 4.4 M/UL (ref 4–5.4)
RBC #/AREA URNS HPF: 11 /HPF (ref 0–4)
SODIUM SERPL-SCNC: 141 MMOL/L (ref 136–145)
SP GR UR STRIP: 1.01 (ref 1–1.03)
SQUAMOUS #/AREA URNS HPF: 1 /HPF
URN SPEC COLLECT METH UR: ABNORMAL
UROBILINOGEN UR STRIP-ACNC: NEGATIVE EU/DL
WBC # BLD AUTO: 12.65 K/UL (ref 3.9–12.7)
WBC #/AREA URNS HPF: 5 /HPF (ref 0–5)

## 2023-12-02 PROCEDURE — 99285 EMERGENCY DEPT VISIT HI MDM: CPT | Mod: 25

## 2023-12-02 PROCEDURE — 36415 COLL VENOUS BLD VENIPUNCTURE: CPT | Performed by: SURGERY

## 2023-12-02 PROCEDURE — 96375 TX/PRO/DX INJ NEW DRUG ADDON: CPT

## 2023-12-02 PROCEDURE — 87040 BLOOD CULTURE FOR BACTERIA: CPT | Mod: 59 | Performed by: SURGERY

## 2023-12-02 PROCEDURE — 80053 COMPREHEN METABOLIC PANEL: CPT | Performed by: SURGERY

## 2023-12-02 PROCEDURE — 81000 URINALYSIS NONAUTO W/SCOPE: CPT | Performed by: SURGERY

## 2023-12-02 PROCEDURE — 85025 COMPLETE CBC W/AUTO DIFF WBC: CPT | Performed by: SURGERY

## 2023-12-02 PROCEDURE — 83605 ASSAY OF LACTIC ACID: CPT | Performed by: SURGERY

## 2023-12-02 PROCEDURE — 96374 THER/PROPH/DIAG INJ IV PUSH: CPT

## 2023-12-02 PROCEDURE — 63600175 PHARM REV CODE 636 W HCPCS: Performed by: SURGERY

## 2023-12-02 RX ORDER — NITROFURANTOIN 25; 75 MG/1; MG/1
100 CAPSULE ORAL 2 TIMES DAILY
Qty: 14 CAPSULE | Refills: 0 | Status: SHIPPED | OUTPATIENT
Start: 2023-12-02 | End: 2023-12-09

## 2023-12-02 RX ORDER — ONDANSETRON 2 MG/ML
4 INJECTION INTRAMUSCULAR; INTRAVENOUS
Status: COMPLETED | OUTPATIENT
Start: 2023-12-02 | End: 2023-12-02

## 2023-12-02 RX ORDER — HYDROMORPHONE HYDROCHLORIDE 1 MG/ML
0.5 INJECTION, SOLUTION INTRAMUSCULAR; INTRAVENOUS; SUBCUTANEOUS
Status: COMPLETED | OUTPATIENT
Start: 2023-12-02 | End: 2023-12-02

## 2023-12-02 RX ORDER — ONDANSETRON 2 MG/ML
4 INJECTION INTRAMUSCULAR; INTRAVENOUS
Status: DISCONTINUED | OUTPATIENT
Start: 2023-12-02 | End: 2023-12-02

## 2023-12-02 RX ORDER — TIZANIDINE HYDROCHLORIDE 4 MG/1
4 CAPSULE, GELATIN COATED ORAL 4 TIMES DAILY PRN
Qty: 10 CAPSULE | Refills: 0 | Status: SHIPPED | OUTPATIENT
Start: 2023-12-02 | End: 2023-12-12

## 2023-12-02 RX ADMIN — ONDANSETRON 4 MG: 2 INJECTION INTRAMUSCULAR; INTRAVENOUS at 11:12

## 2023-12-02 RX ADMIN — HYDROMORPHONE HYDROCHLORIDE 0.5 MG: 1 INJECTION, SOLUTION INTRAMUSCULAR; INTRAVENOUS; SUBCUTANEOUS at 11:12

## 2023-12-03 VITALS
SYSTOLIC BLOOD PRESSURE: 111 MMHG | WEIGHT: 123 LBS | OXYGEN SATURATION: 95 % | BODY MASS INDEX: 21.79 KG/M2 | HEIGHT: 63 IN | RESPIRATION RATE: 16 BRPM | TEMPERATURE: 98 F | HEART RATE: 78 BPM | DIASTOLIC BLOOD PRESSURE: 57 MMHG

## 2023-12-03 RX ORDER — ONDANSETRON 4 MG/1
4 TABLET, ORALLY DISINTEGRATING ORAL EVERY 8 HOURS PRN
Qty: 20 TABLET | Refills: 0 | Status: SHIPPED | OUTPATIENT
Start: 2023-12-03

## 2023-12-03 NOTE — TELEPHONE ENCOUNTER
"Pt calling, non-[facility] PCP, states started nitrofurantoin for possible UTI with associated hematuria. Started abx on 11/21, was told the urine came back negative, but was seen by PCP on 12/1 and told to resume abx. Reports n/v, 101-102 fever, generalized weakness, and worsening palpitations. Advised to nearest ED now with 911 precautions per protocol, pt verbalized understanding, stated she would go to [facility] facility. Reiterated importance of going to nearest ED, pt verbalized understanding.    Reason for Disposition   [1] Taking antibiotics > 24 hours AND [2] symptoms WORSE   Extra heartbeats, irregular heart beating, or heart is beating very fast  (i.e., "palpitations")    Additional Information   Negative: SEVERE difficulty breathing (e.g., struggling for each breath, speaks in single words)   Negative: Sounds like a life-threatening emergency to the triager   Negative: MODERATE difficulty breathing (e.g., speaks in phrases, SOB even at rest, pulse 100-120)   Negative: Fever > 103 F (39.4 C)   Negative: Patient sounds very sick or weak to the triager   Negative: SEVERE difficulty breathing (e.g., struggling for each breath, speaks in single words)   Negative: Shock suspected (e.g., cold/pale/clammy skin, too weak to stand, low BP, rapid pulse)   Negative: Difficult to awaken or acting confused (e.g., disoriented, slurred speech)   Negative: [1] Fainted > 15 minutes ago AND [2] still feels too weak or dizzy to stand   Negative: [1] SEVERE weakness (i.e., unable to walk or barely able to walk, requires support) AND [2] new-onset or worsening   Negative: Sounds like a life-threatening emergency to the triager   Negative: Difficulty breathing   Negative: Heart beating < 50 beats per minute OR > 140 beats per minute    Protocols used: Infection on Antibiotic Follow-up Call-A-AH, Weakness (Generalized) and Fatigue-A-AH    "

## 2023-12-03 NOTE — ED NOTES
Pt provided a urine specimen cup, castile soap towelette wipe, and instructions for [facility]. Pt verbalizes understanding of a clean catch collection. Will continue to monitor.

## 2023-12-03 NOTE — ED PROVIDER NOTES
Encounter Date: 12/2/2023       History     Chief Complaint   Patient presents with    Flank Pain     Pt reports hematuria since 11/28. Treated for UTI with abx and followed up for possible stone yesterday. Pt reports worsening today with weakness, nausea, fever, dysuria, and right flank pain.        Melanie Armenta is a 63 y.o. female that presents with right flank pain this evening  Patient thinks that she is UTI has been on Macrobid for a week on ER interview  No hematuria will would like to be evaluated for kidney stones in the ER today  No abdominal pain, no nausea vomiting, no fever, does not look toxic on triage    The history is provided by the patient.     Review of patient's allergies indicates:   Allergen Reactions    Aspirin     Azithromycin     Cephalexin Other (See Comments)    Clonazepam Other (See Comments)    Gabapentin     Hydrocodone-acetaminophen Other (See Comments)    Pcn [penicillins]     Pramipexole     Ropinirole Other (See Comments) and Hallucinations    Sertraline     Sulfa (sulfonamide antibiotics)     Tramadol-acetaminophen     Ciprofloxacin Nausea Only     Past Medical History:   Diagnosis Date    Alopecia     Anxiety     Arthritis     Bacterial gastroenteritis     Cancer     cervix    Cataract     bilateral     Cervical disc disease     COVID-19     Depression     Diverticulosis     Dizziness     Dry eyes     Epilepsy     GERD (gastroesophageal reflux disease)     Headache     History of fibromatosis     Hypertension     Hypoxemia     Insomnia     Liver cyst     Malaise and fatigue     Other chronic allergic conjunctivitis     Palpitations     Polyarthropathy     Renal disorder     Restless leg     Rheumatoid arthritis, unspecified     Seizures     Sleep apnea     TIA (transient ischemic attack)     Unspecified osteoarthritis, unspecified site      Past Surgical History:   Procedure Laterality Date    APPENDECTOMY      cryopreservation ovary      ESOPHAGOGASTRODUODENOSCOPY N/A 5/5/2023     Procedure: EGD (ESOPHAGOGASTRODUODENOSCOPY);  Surgeon: Lopez Shukla MD;  Location: [facility];  Service: Endoscopy;  Laterality: N/A;    EYE SURGERY Right     abscess    HYSTERECTOMY      OVARIAN CYST SURGERY       Family History   Problem Relation Age of Onset    COPD Mother     Stroke Father      Social History     Tobacco Use    Smoking status: Every Day     Current packs/day: 2.00     Types: Cigarettes   Substance Use Topics    Alcohol use: No    Drug use: No     Review of Systems   Constitutional: Negative.    HENT: Negative.     Eyes: Negative.    Respiratory: Negative.     Cardiovascular: Negative.    Gastrointestinal: Negative.    Genitourinary:  Positive for dysuria.   Musculoskeletal: Negative.    Skin: Negative.    Neurological: Negative.    Psychiatric/Behavioral: Negative.         Physical Exam     Initial Vitals [12/02/23 2109]   BP Pulse Resp Temp SpO2   (!) 173/92 (!) 118 20 98.4 °F (36.9 °C) 96 %      MAP       --         Physical Exam    Nursing note and vitals reviewed.  Constitutional: Vital signs are normal. She appears well-developed and well-nourished. She is cooperative.   HENT:   Head: Normocephalic and atraumatic.   Eyes: Conjunctivae, EOM and lids are normal. Pupils are equal, round, and reactive to light.   Neck: Trachea normal and phonation normal. Neck supple. No JVD present.   Normal range of motion.   Full passive range of motion without pain.     Cardiovascular:  Normal rate, regular rhythm, S1 normal, S2 normal, normal heart sounds, intact distal pulses and normal pulses.           Pulmonary/Chest: Effort normal and breath sounds normal.   Abdominal: Abdomen is soft and flat. Bowel sounds are normal.   Musculoskeletal:         General: Normal range of motion.      Cervical back: Full passive range of motion without pain, normal range of motion and neck supple.     Neurological: She is alert and oriented to person, place, and time. She has normal strength.   Skin: Skin is  warm, dry and intact. Capillary refill takes less than 2 seconds.         ED Course   Procedures  Labs Reviewed   CBC W/ AUTO DIFFERENTIAL - Abnormal; Notable for the following components:       Result Value    MPV 9.1 (*)     Mono # 1.1 (*)     All other components within normal limits   COMPREHENSIVE METABOLIC PANEL - Abnormal; Notable for the following components:    Creatinine 1.7 (*)     ALT 8 (*)     eGFR 33 (*)     All other components within normal limits   URINALYSIS, REFLEX TO URINE CULTURE - Abnormal; Notable for the following components:    Occult Blood UA Trace (*)     Leukocytes, UA Trace (*)     All other components within normal limits    Narrative:     Specimen Source->Urine   URINALYSIS MICROSCOPIC - Abnormal; Notable for the following components:    RBC, UA 11 (*)     Bacteria Few (*)     All other components within normal limits    Narrative:     Specimen Source->Urine   CULTURE, BLOOD   CULTURE, BLOOD   LACTIC ACID, PLASMA          Imaging Results              CT Renal Stone Study ABD Pelvis WO (Final result)  Result time 12/02/23 23:30:07      Final result by Derik Garcia MD (12/02/23 23:30:07)                   Impression:      1. No acute abnormality.  2. Diverticulosis of the left colon.  3. Possible constipation.  4. 5 mm right lower lobe pulmonary nodule.  See above comments.  5. Possible small left hepatic lobe cyst.      Electronically signed by: Derik Garcia  Date:    12/02/2023  Time:    23:30               Narrative:    EXAMINATION:  CT RENAL STONE STUDY ABD PELVIS WO    CLINICAL HISTORY:  Flank pain, kidney stone suspected;Nephrolithiasis, symptomatic/complicated;Pain lumbago (724.2);    TECHNIQUE:  Low dose axial images, sagittal and coronal reformations were obtained from the lung bases to the pubic symphysis.  Contrast was not administered.    COMPARISON:  11/15/2016    FINDINGS:  Heart: Normal in size. No pericardial effusion.    Lung Bases: 5 mm pulmonary nodule right  lower lobe posteriorly on axial 4 of series 2.    For a solid nodule <6 mm, Fleischner Society 2017 guidelines recommend no routine follow up for a low risk patient, or follow-up with non-contrast chest CT at 12 months in a high risk patient.    Liver: 7 and 10 mm hypodense foci in the left lobe on axial 13 and 19, possibly small cysts.  Characterization is limited on this noncontrast study.    Gallbladder: No calcified gallstones.    Bile Ducts: No evidence of dilated ducts.    Pancreas: No mass or peripancreatic fat stranding.    Spleen: Unremarkable.    Adrenals: Unremarkable.    Kidneys/ Ureters: No stone, mass or hydronephrosis bilaterally.    Bladder: No evidence of wall thickening.    Reproductive organs: Status post hysterectomy.    GI Tract/Mesentery: Diverticulosis of the left colon.  No evidence of acute diverticulitis.  Moderate retained feces throughout the colon.  No evidence of bowel obstruction.    The appendix is not identified.  No evidence of appendicitis.    Peritoneal Space: No ascites. No free air.    Retroperitoneum: No significant adenopathy.    Abdominal wall: Unremarkable.    Vasculature: No significant atherosclerosis or aneurysm.    Bones: No acute fracture.    Mild diffuse disc bulge is at the disc plane throughout the lumbar spine.  No significant central canal or foraminal narrowing.  Disc spaces are adequately maintained.  Posterior elements are intact.                                       Medications   HYDROmorphone injection 0.5 mg (has no administration in time range)   ondansetron injection 4 mg (has no administration in time range)     Medical Decision Making  Right flank pain with long history of urinary tract infections noted today  Specifically requesting a CT scan to rule out kidney stone on ER triage  Differential includes kidney stone, UTI, pyelonephritis, musculoskeletal pain    Problems Addressed:  Acute cystitis without hematuria: complicated acute illness or  injury  Flank pain: complicated acute illness or injury    Amount and/or Complexity of Data Reviewed  Labs: ordered. Decision-making details documented in ED Course.  Radiology: ordered and independent interpretation performed.    ED Management & Risks of Complication, Morbidity, & Mortality:  Lab work within normal limits, urinalysis indicates UTI  Will continue Macrobid going forward on ER discharge  Refilled in the ER this evening, CT scan: No surgical findings  Requesting a shot of Dilaudid for pain on ER discharge today  Recommend PCP follow-up & culture follow-up on discharge    Need for Hospitalization or Surgery with Social Determinants of Health:  Carefully counseled to follow-up within next 48 hours after ER discharge  Follow-up with PCP &/or specialist until complete resolution of symptoms  This patient does not need to be hospitalized on ER evaluation today  The patient's diagnosis is not limited by social determinants of health  Does not require surgery or procedure (major or minor), no risk factors  Pt/Family counseled to return to the ER with any concerning symptoms     Clinical Impression:  Final diagnoses:  [R10.9] Flank pain (Primary)  [N30.00] Acute cystitis without hematuria          ED Disposition Condition    Discharge Stable          ED Prescriptions       Medication Sig Dispense Start Date End Date Auth. Provider    nitrofurantoin, macrocrystal-monohydrate, (MACROBID) 100 MG capsule Take 1 capsule (100 mg total) by mouth 2 (two) times daily. for 7 days 14 capsule 12/2/2023 12/9/2023 Davie Butcher MD    tiZANidine 4 mg Cap Take 4 mg by mouth 4 (four) times daily as needed. 10 capsule 12/2/2023 12/12/2023 Davie Butcher MD          Follow-up Information       Follow up With Specialties Details Why Contact Info    Valery Howell MD Family Medicine Schedule an appointment as soon as possible for a visit in 2 days  [id number]   [place] [id number]  [phone]               Davie Butcher  MD ABEBE  12/02/23 9712

## 2023-12-03 NOTE — ED TRIAGE NOTES
63 y.o. female presents to ER ED 02/ED 02A   Chief Complaint   Patient presents with    Flank Pain     Pt reports hematuria since 11/28. Treated for UTI with abx and followed up for possible stone yesterday. Pt reports worsening today with weakness, nausea, fever, dysuria, and right flank pain.

## 2023-12-08 LAB
BACTERIA BLD CULT: NORMAL
BACTERIA BLD CULT: NORMAL

## 2023-12-18 ENCOUNTER — OFFICE VISIT (OUTPATIENT)
Dept: UROLOGY | Facility: CLINIC | Age: 63
End: 2023-12-18
Attending: SPECIALIST
Payer: MEDICARE

## 2023-12-18 VITALS
BODY MASS INDEX: 21.79 KG/M2 | SYSTOLIC BLOOD PRESSURE: 110 MMHG | HEIGHT: 63 IN | OXYGEN SATURATION: 95 % | WEIGHT: 123 LBS | HEART RATE: 86 BPM | DIASTOLIC BLOOD PRESSURE: 62 MMHG

## 2023-12-18 DIAGNOSIS — R31.21 ASYMPTOMATIC MICROSCOPIC HEMATURIA: Primary | ICD-10-CM

## 2023-12-18 PROCEDURE — 4010F PR ACE/ARB THEARPY RXD/TAKEN: ICD-10-PCS | Mod: CPTII,S$GLB,, | Performed by: SPECIALIST

## 2023-12-18 PROCEDURE — 1101F PT FALLS ASSESS-DOCD LE1/YR: CPT | Mod: CPTII,S$GLB,, | Performed by: SPECIALIST

## 2023-12-18 PROCEDURE — 99203 PR OFFICE/OUTPT VISIT, NEW, LEVL III, 30-44 MIN: ICD-10-PCS | Mod: S$GLB,,, | Performed by: SPECIALIST

## 2023-12-18 PROCEDURE — 3078F DIAST BP <80 MM HG: CPT | Mod: CPTII,S$GLB,, | Performed by: SPECIALIST

## 2023-12-18 PROCEDURE — 3008F PR BODY MASS INDEX (BMI) DOCUMENTED: ICD-10-PCS | Mod: CPTII,S$GLB,, | Performed by: SPECIALIST

## 2023-12-18 PROCEDURE — 1159F PR MEDICATION LIST DOCUMENTED IN MEDICAL RECORD: ICD-10-PCS | Mod: CPTII,S$GLB,, | Performed by: SPECIALIST

## 2023-12-18 PROCEDURE — 99999 PR PBB SHADOW E&M-EST. PATIENT-LVL III: CPT | Mod: PBBFAC,,, | Performed by: SPECIALIST

## 2023-12-18 PROCEDURE — 3288F FALL RISK ASSESSMENT DOCD: CPT | Mod: CPTII,S$GLB,, | Performed by: SPECIALIST

## 2023-12-18 PROCEDURE — 99999 PR PBB SHADOW E&M-EST. PATIENT-LVL III: ICD-10-PCS | Mod: PBBFAC,,, | Performed by: SPECIALIST

## 2023-12-18 PROCEDURE — 1160F RVW MEDS BY RX/DR IN RCRD: CPT | Mod: CPTII,S$GLB,, | Performed by: SPECIALIST

## 2023-12-18 PROCEDURE — 3288F PR FALLS RISK ASSESSMENT DOCUMENTED: ICD-10-PCS | Mod: CPTII,S$GLB,, | Performed by: SPECIALIST

## 2023-12-18 PROCEDURE — 1159F MED LIST DOCD IN RCRD: CPT | Mod: CPTII,S$GLB,, | Performed by: SPECIALIST

## 2023-12-18 PROCEDURE — 4010F ACE/ARB THERAPY RXD/TAKEN: CPT | Mod: CPTII,S$GLB,, | Performed by: SPECIALIST

## 2023-12-18 PROCEDURE — 3078F PR MOST RECENT DIASTOLIC BLOOD PRESSURE < 80 MM HG: ICD-10-PCS | Mod: CPTII,S$GLB,, | Performed by: SPECIALIST

## 2023-12-18 PROCEDURE — 1160F PR REVIEW ALL MEDS BY PRESCRIBER/CLIN PHARMACIST DOCUMENTED: ICD-10-PCS | Mod: CPTII,S$GLB,, | Performed by: SPECIALIST

## 2023-12-18 PROCEDURE — 3074F PR MOST RECENT SYSTOLIC BLOOD PRESSURE < 130 MM HG: ICD-10-PCS | Mod: CPTII,S$GLB,, | Performed by: SPECIALIST

## 2023-12-18 PROCEDURE — 1101F PR PT FALLS ASSESS DOC 0-1 FALLS W/OUT INJ PAST YR: ICD-10-PCS | Mod: CPTII,S$GLB,, | Performed by: SPECIALIST

## 2023-12-18 PROCEDURE — 3008F BODY MASS INDEX DOCD: CPT | Mod: CPTII,S$GLB,, | Performed by: SPECIALIST

## 2023-12-18 PROCEDURE — 3074F SYST BP LT 130 MM HG: CPT | Mod: CPTII,S$GLB,, | Performed by: SPECIALIST

## 2023-12-18 PROCEDURE — 99203 OFFICE O/P NEW LOW 30 MIN: CPT | Mod: S$GLB,,, | Performed by: SPECIALIST

## 2023-12-18 NOTE — PROGRESS NOTES
"[facility] - Urology   Clinic Note    SUBJECTIVE:     Chief Complaint   Patient presents with    Urinary Tract Infection     Pt currently has UTI, states she has blood in urine but it has slowed down since she first seen it 2-3 weeks ago. Doesn't believe she's passed any clots.       Referral from: Self, Aaareferral.    History of Present Illness:  Melanie Armenta is a 63 y.o. female who presents to clinic for microscopic hematuria.    Pleasant 63 y.o. F with history of recurrent UTIs and urolithiasis, who presented to the ED with acute onset of right flank pain.  CT scan failed to reveal a stone.  UA consistent with microscopic hematuria.  She was encouraged to see a urologist for follow up.  Fortunately , her pain has subsided.   She mentions her bladder is "always infected," however, she mentions she's allergic to most antibiotics.   Past Medical History:   Diagnosis Date    Alopecia     Anxiety     Arthritis     Bacterial gastroenteritis     Cancer     cervix    Cataract     bilateral     Cervical disc disease     COVID-19     Depression     Diverticulosis     Dizziness     Dry eyes     Epilepsy     GERD (gastroesophageal reflux disease)     Headache     History of fibromatosis     Hypertension     Hypoxemia     Insomnia     Liver cyst     Malaise and fatigue     Other chronic allergic conjunctivitis     Palpitations     Polyarthropathy     Renal disorder     Restless leg     Rheumatoid arthritis, unspecified     Seizures     Sleep apnea     TIA (transient ischemic attack)     Unspecified osteoarthritis, unspecified site        Current Outpatient Medications on File Prior to Visit   Medication Sig Dispense Refill    amLODIPine (NORVASC) 5 MG tablet Take 5 mg by mouth every evening.      carboxymethylcellulose (RETAINE CMC) 0.5 % Dpet 1 drop 3 (three) times daily as needed.      clopidogrel (PLAVIX) 75 mg tablet Take 75 mg by mouth once daily.      cycloSPORINE (RESTASIS) 0.05 % ophthalmic emulsion " "Place 1 drop into both eyes 2 (two) times daily.      erythromycin (ROMYCIN) ophthalmic ointment Place 1 cm into both eyes 3 (three) times daily.      fexofenadine (ALLEGRA) 60 MG tablet Take 60 mg by mouth once daily.      hydrOXYzine pamoate (VISTARIL) 25 MG Cap Take 25 mg by mouth once daily.       lisinopriL (PRINIVIL,ZESTRIL) 40 MG tablet Take 40 mg by mouth once daily.      metoprolol succinate (TOPROL-XL) 25 MG 24 hr tablet Take 50 mg by mouth daily as needed.      pantoprazole (PROTONIX) 40 MG tablet Take 40 mg by mouth once daily.      pravastatin (PRAVACHOL) 20 MG tablet Take 20 mg by mouth once daily.      doxycycline (VIBRAMYCIN) 100 MG Cap Take 100 mg by mouth once daily.      Lactobacillus 3/FOS/pantethine (PROBIOTIC AND ACIDOPHILUS ORAL) Take 1 tablet by mouth once daily.      ondansetron (ZOFRAN-ODT) 4 MG TbDL Take 1 tablet (4 mg total) by mouth every 8 (eight) hours as needed (nausea). (Patient not taking: Reported on 12/18/2023) 20 tablet 0     Current Facility-Administered Medications on File Prior to Visit   Medication Dose Route Frequency Provider Last Rate Last Admin    0.9%  NaCl infusion   Intravenous Continuous Pellegrin, Lopez VEGA MD   Stopped at 05/05/23 0815         OBJECTIVE:     Estimated body mass index is 21.79 kg/m² as calculated from the following:    Height as of this encounter: 5' 3" (1.6 m).    Weight as of this encounter: 55.8 kg (123 lb 0.3 oz).    Vital Signs (Most Recent)  Vitals:    12/18/23 1036   BP: 110/62   Pulse: 86       Physical Exam:    Physical Exam     GENERAL: patient sitting comfortably  NEURO: grossly normal with no focal deficits  PSYCH: appropriate mood and affect    Genitourinary Exam:  deferred until cysto      LABS:     Lab Results   Component Value Date    BUN 16 12/02/2023    CREATININE 1.7 (H) 12/02/2023    WBC 12.65 12/02/2023    HGB 13.1 12/02/2023    HCT 40.0 12/02/2023     12/02/2023    AST 13 12/02/2023    ALT 8 (L) 12/02/2023    ALKPHOS 89 " "12/02/2023    ALBUMIN 4.2 12/02/2023    INR 1.0 08/20/2014        Urinalysis:   No results found for: "UAREFLEX"       Imaging:  I have personally reviewed all relevant imaging studies.    Results for orders placed or performed during the hospital encounter of 12/02/23 (from the past 2160 hour(s))   CT Renal Stone Study ABD Pelvis WO    Narrative    EXAMINATION:  CT RENAL STONE STUDY ABD PELVIS WO    CLINICAL HISTORY:  Flank pain, kidney stone suspected;Nephrolithiasis, symptomatic/complicated;Pain lumbago (724.2);    TECHNIQUE:  Low dose axial images, sagittal and coronal reformations were obtained from the lung bases to the pubic symphysis.  Contrast was not administered.    COMPARISON:  11/15/2016    FINDINGS:  Heart: Normal in size. No pericardial effusion.    Lung Bases: 5 mm pulmonary nodule right lower lobe posteriorly on axial 4 of series 2.    For a solid nodule <6 mm, Fleischner Society 2017 guidelines recommend no routine follow up for a low risk patient, or follow-up with non-contrast chest CT at 12 months in a high risk patient.    Liver: 7 and 10 mm hypodense foci in the left lobe on axial 13 and 19, possibly small cysts.  Characterization is limited on this noncontrast study.    Gallbladder: No calcified gallstones.    Bile Ducts: No evidence of dilated ducts.    Pancreas: No mass or peripancreatic fat stranding.    Spleen: Unremarkable.    Adrenals: Unremarkable.    Kidneys/ Ureters: No stone, mass or hydronephrosis bilaterally.    Bladder: No evidence of wall thickening.    Reproductive organs: Status post hysterectomy.    GI Tract/Mesentery: Diverticulosis of the left colon.  No evidence of acute diverticulitis.  Moderate retained feces throughout the colon.  No evidence of bowel obstruction.    The appendix is not identified.  No evidence of appendicitis.    Peritoneal Space: No ascites. No free air.    Retroperitoneum: No significant adenopathy.    Abdominal wall: Unremarkable.    Vasculature: No " significant atherosclerosis or aneurysm.    Bones: No acute fracture.    Mild diffuse disc bulge is at the disc plane throughout the lumbar spine.  No significant central canal or foraminal narrowing.  Disc spaces are adequately maintained.  Posterior elements are intact.      Impression    1. No acute abnormality.  2. Diverticulosis of the left colon.  3. Possible constipation.  4. 5 mm right lower lobe pulmonary nodule.  See above comments.  5. Possible small left hepatic lobe cyst.      Electronically signed by: Derik Velez  Date:    12/02/2023  Time:    23:30     No results found for this or any previous visit (from the past 2160 hour(s)).  CT Renal Stone Study ABD Pelvis WO  Narrative: EXAMINATION:  CT RENAL STONE STUDY ABD PELVIS WO    CLINICAL HISTORY:  Flank pain, kidney stone suspected;Nephrolithiasis, symptomatic/complicated;Pain lumbago (724.2);    TECHNIQUE:  Low dose axial images, sagittal and coronal reformations were obtained from the lung bases to the pubic symphysis.  Contrast was not administered.    COMPARISON:  11/15/2016    FINDINGS:  Heart: Normal in size. No pericardial effusion.    Lung Bases: 5 mm pulmonary nodule right lower lobe posteriorly on axial 4 of series 2.    For a solid nodule <6 mm, Fleischner Society 2017 guidelines recommend no routine follow up for a low risk patient, or follow-up with non-contrast chest CT at 12 months in a high risk patient.    Liver: 7 and 10 mm hypodense foci in the left lobe on axial 13 and 19, possibly small cysts.  Characterization is limited on this noncontrast study.    Gallbladder: No calcified gallstones.    Bile Ducts: No evidence of dilated ducts.    Pancreas: No mass or peripancreatic fat stranding.    Spleen: Unremarkable.    Adrenals: Unremarkable.    Kidneys/ Ureters: No stone, mass or hydronephrosis bilaterally.    Bladder: No evidence of wall thickening.    Reproductive organs: Status post hysterectomy.    GI Tract/Mesentery:  Diverticulosis of the left colon.  No evidence of acute diverticulitis.  Moderate retained feces throughout the colon.  No evidence of bowel obstruction.    The appendix is not identified.  No evidence of appendicitis.    Peritoneal Space: No ascites. No free air.    Retroperitoneum: No significant adenopathy.    Abdominal wall: Unremarkable.    Vasculature: No significant atherosclerosis or aneurysm.    Bones: No acute fracture.    Mild diffuse disc bulge is at the disc plane throughout the lumbar spine.  No significant central canal or foraminal narrowing.  Disc spaces are adequately maintained.  Posterior elements are intact.  Impression: 1. No acute abnormality.  2. Diverticulosis of the left colon.  3. Possible constipation.  4. 5 mm right lower lobe pulmonary nodule.  See above comments.  5. Possible small left hepatic lobe cyst.    Electronically signed by: Derik Velez  Date:    12/02/2023  Time:    23:30         ASSESSMENT     1. Asymptomatic microscopic hematuria        PLAN:     Outpatient cystoscopy, consider urine for cytology  30 minutes spent with patient  Oneal Barillas MD  Urology  [facility]     Disclaimer: This note has been generated using voice-recognition software. There may be typographical errors that have been missed during proof-reading.

## 2024-01-08 ENCOUNTER — TELEPHONE (OUTPATIENT)
Dept: UROLOGY | Facility: CLINIC | Age: 64
End: 2024-01-08
Payer: MEDICARE

## 2024-01-08 NOTE — TELEPHONE ENCOUNTER
Patient requested procedure from 1/9/2024 be rescheduled. Rescheduled appointment until 1/17/2024 @ 1:30pm, pt verbalized understanding.

## 2024-01-08 NOTE — TELEPHONE ENCOUNTER
----- Message from Nayeli Chowdary sent at 2024 11:29 AM CST -----  Contact: MAGUE Armenta  MRN: [id number]  : 1960  PCP: Valery Howell  Home Phone      [phone]  Work Phone      Not on file.  Mobile          [phone]      MESSAGE: Patient is needing to reschedule the procedure that is scheduled for tomorrow.  She would like to reschedule it for next Tuesday, Wednesday or Thursday.        Phone: [phone]

## 2024-01-18 ENCOUNTER — PROCEDURE VISIT (OUTPATIENT)
Dept: UROLOGY | Facility: CLINIC | Age: 64
End: 2024-01-18
Attending: SPECIALIST
Payer: MEDICARE

## 2024-01-18 DIAGNOSIS — R31.21 ASYMPTOMATIC MICROSCOPIC HEMATURIA: Primary | ICD-10-CM

## 2024-01-18 PROCEDURE — 52000 CYSTOURETHROSCOPY: CPT | Mod: S$GLB,,, | Performed by: SPECIALIST

## 2024-01-18 RX ORDER — ESTRADIOL 0.1 MG/G
1 CREAM VAGINAL
Qty: 8 G | Refills: 11 | Status: SHIPPED | OUTPATIENT
Start: 2024-01-18 | End: 2025-01-17

## 2024-01-18 NOTE — PROCEDURES
Cystoscopy    Date/Time: 1/18/2024 1:30 PM    Performed by: Oneal Barillas MD  Authorized by: Oneal Barillas MD    Consent Done?:  Yes (Written)  Prep: patient was prepped and draped in usual sterile fashion    Local anesthesia used?: Yes    Anesthesia:  Intraurethral instillation  Local anesthetic:  Lidocaine 2% topical gel  Indications: recurrent UTIs and dysuria    Position:  Dorsal lithotomy  Anesthesia:  Intraurethral instillation  Patient sedated?: No    Preparation: Patient was prepped and draped in usual sterile fashion    Scope type:  Flexible cystoscope  External exam performed: atrophic female genitalia, no lesions.    Digital exam performed: No    Urethra normal: No (stenotic urethra)    Bladder neck normal: Yes    Bladder normal: Yes     patient tolerated the procedure well with no immediate complications  Comments:      Patient mentions she is s/p TRIXIE/BSO at age of 30.  I suspect her symptoms are associated with estrogen deficiency.  I needed to dilate meatus and urethra with 14 and 18 Fr catheter in order to pass flexible cystoscope.      Pt. Tolerated procedure well.  RTC 3 months.

## 2024-01-22 ENCOUNTER — TELEPHONE (OUTPATIENT)
Dept: UROLOGY | Facility: CLINIC | Age: 64
End: 2024-01-22
Payer: MEDICARE

## 2024-01-22 DIAGNOSIS — N34.2 INFECTIVE URETHRITIS: Primary | ICD-10-CM

## 2024-01-22 RX ORDER — LEVOFLOXACIN 250 MG/1
250 TABLET ORAL DAILY
Qty: 7 TABLET | Refills: 0 | Status: SHIPPED | OUTPATIENT
Start: 2024-01-22

## 2024-01-22 NOTE — TELEPHONE ENCOUNTER
I spoke with patient, I suspect she has a UTI.  She says she can't take most abx, but has only mild nausea with cipro.  I will forward Rx for Levaquin, make f/u apt. In clinic.  MS

## 2024-07-28 ENCOUNTER — HOSPITAL ENCOUNTER (EMERGENCY)
Facility: HOSPITAL | Age: 64
Discharge: HOME | End: 2024-07-28
Attending: SURGERY
Payer: MEDICARE

## 2024-07-28 VITALS
HEIGHT: 63 IN | RESPIRATION RATE: 18 BRPM | OXYGEN SATURATION: 97 % | HEART RATE: 97 BPM | WEIGHT: 125 LBS | SYSTOLIC BLOOD PRESSURE: 142 MMHG | BODY MASS INDEX: 22.15 KG/M2 | TEMPERATURE: 98 F | DIASTOLIC BLOOD PRESSURE: 75 MMHG

## 2024-07-28 DIAGNOSIS — K64.8 BLEEDING INTERNAL HEMORRHOIDS: ICD-10-CM

## 2024-07-28 DIAGNOSIS — K62.5 RECTAL BLEEDING: Primary | ICD-10-CM

## 2024-07-28 LAB
ALBUMIN SERPL BCP-MCNC: 4.1 G/DL (ref 3.5–5.2)
ALP SERPL-CCNC: 82 U/L (ref 55–135)
ALT SERPL W/O P-5'-P-CCNC: 12 U/L (ref 10–44)
ANION GAP SERPL CALC-SCNC: 12 MMOL/L (ref 8–16)
AST SERPL-CCNC: 17 U/L (ref 10–40)
BASOPHILS # BLD AUTO: 0.05 K/UL (ref 0–0.2)
BASOPHILS NFR BLD: 0.5 % (ref 0–1.9)
BILIRUB SERPL-MCNC: 0.6 MG/DL (ref 0.1–1)
BUN SERPL-MCNC: 8 MG/DL (ref 8–23)
CALCIUM SERPL-MCNC: 10 MG/DL (ref 8.7–10.5)
CHLORIDE SERPL-SCNC: 104 MMOL/L (ref 95–110)
CO2 SERPL-SCNC: 24 MMOL/L (ref 23–29)
CREAT SERPL-MCNC: 0.9 MG/DL (ref 0.5–1.4)
DIFFERENTIAL METHOD BLD: ABNORMAL
EOSINOPHIL # BLD AUTO: 0.1 K/UL (ref 0–0.5)
EOSINOPHIL NFR BLD: 1.2 % (ref 0–8)
ERYTHROCYTE [DISTWIDTH] IN BLOOD BY AUTOMATED COUNT: 12.9 % (ref 11.5–14.5)
EST. GFR  (NO RACE VARIABLE): >60 ML/MIN/1.73 M^2
GLUCOSE SERPL-MCNC: 103 MG/DL (ref 70–110)
HCT VFR BLD AUTO: 40.5 % (ref 37–48.5)
HGB BLD-MCNC: 13.7 G/DL (ref 12–16)
IMM GRANULOCYTES # BLD AUTO: 0.03 K/UL (ref 0–0.04)
IMM GRANULOCYTES NFR BLD AUTO: 0.3 % (ref 0–0.5)
INR PPP: 1 (ref 0.8–1.2)
LYMPHOCYTES # BLD AUTO: 3.1 K/UL (ref 1–4.8)
LYMPHOCYTES NFR BLD: 29.8 % (ref 18–48)
MCH RBC QN AUTO: 30.4 PG (ref 27–31)
MCHC RBC AUTO-ENTMCNC: 33.8 G/DL (ref 32–36)
MCV RBC AUTO: 90 FL (ref 82–98)
MONOCYTES # BLD AUTO: 0.8 K/UL (ref 0.3–1)
MONOCYTES NFR BLD: 7.2 % (ref 4–15)
NEUTROPHILS # BLD AUTO: 6.3 K/UL (ref 1.8–7.7)
NEUTROPHILS NFR BLD: 61 % (ref 38–73)
NRBC BLD-RTO: 0 /100 WBC
PLATELET # BLD AUTO: 268 K/UL (ref 150–450)
PMV BLD AUTO: 9 FL (ref 9.2–12.9)
POTASSIUM SERPL-SCNC: 4 MMOL/L (ref 3.5–5.1)
PROT SERPL-MCNC: 7.4 G/DL (ref 6–8.4)
PROTHROMBIN TIME: 10.8 SEC (ref 9–12.5)
RBC # BLD AUTO: 4.51 M/UL (ref 4–5.4)
SODIUM SERPL-SCNC: 140 MMOL/L (ref 136–145)
WBC # BLD AUTO: 10.39 K/UL (ref 3.9–12.7)

## 2024-07-28 PROCEDURE — 80053 COMPREHEN METABOLIC PANEL: CPT | Performed by: SURGERY

## 2024-07-28 PROCEDURE — 85610 PROTHROMBIN TIME: CPT | Performed by: SURGERY

## 2024-07-28 PROCEDURE — 85025 COMPLETE CBC W/AUTO DIFF WBC: CPT | Performed by: SURGERY

## 2024-07-28 PROCEDURE — 99283 EMERGENCY DEPT VISIT LOW MDM: CPT

## 2024-07-28 NOTE — ED PROVIDER NOTES
Encounter Date: 7/28/2024       History     Chief Complaint   Patient presents with    Rectal Bleeding     Rectal bleeding x2-3 months intermittently. Reports more blood seen in toilet than usual this am. C/o right low back pain and intermittent lower abd pain x2-3 weeks with associated nausea.      Patient with past history of hemorrhoids complains of intermittent bleeding for last 2 3 months but this morning noticed blood in the toilet when she went to urinate she usually has blood when she wipes on her toilet paper denies abdominal pain or lower abdominal cramping    The history is provided by the patient.   Rectal Bleeding   The current episode started today. The onset was sudden. The problem occurs rarely. The problem has been unchanged. The pain is at a severity of 0/10. The stool is described as soft. There was no prior successful therapy. There was no prior unsuccessful therapy. Associated symptoms include hemorrhoids.     Review of patient's allergies indicates:   Allergen Reactions    Aspirin     Azithromycin     Cephalexin Other (See Comments)    Clonazepam Other (See Comments)    Gabapentin     Hydrocodone-acetaminophen Other (See Comments)    Pcn [penicillins]     Pramipexole     Ropinirole Other (See Comments) and Hallucinations    Sertraline     Sulfa (sulfonamide antibiotics)     Tramadol-acetaminophen     Ciprofloxacin Nausea Only     Past Medical History:   Diagnosis Date    Alopecia     Anxiety     Arthritis     Bacterial gastroenteritis     Cancer     cervix    Cataract     bilateral     Cervical disc disease     COVID-19     Depression     Diverticulosis     Dizziness     Dry eyes     Epilepsy     GERD (gastroesophageal reflux disease)     Headache     History of fibromatosis     Hypertension     Hypoxemia     Insomnia     Liver cyst     Malaise and fatigue     Other chronic allergic conjunctivitis     Palpitations     Polyarthropathy     Renal disorder     Restless leg     Rheumatoid arthritis,  unspecified     Seizures     Sleep apnea     TIA (transient ischemic attack)     Unspecified osteoarthritis, unspecified site      Past Surgical History:   Procedure Laterality Date    APPENDECTOMY      cryopreservation ovary      ESOPHAGOGASTRODUODENOSCOPY N/A 5/5/2023    Procedure: EGD (ESOPHAGOGASTRODUODENOSCOPY);  Surgeon: Lopez Shukla MD;  Location: [facility];  Service: Endoscopy;  Laterality: N/A;    EYE SURGERY Right     abscess    HYSTERECTOMY      OVARIAN CYST SURGERY       Family History   Problem Relation Name Age of Onset    COPD Mother      Stroke Father       Social History     Tobacco Use    Smoking status: Every Day     Current packs/day: 2.00     Types: Cigarettes   Substance Use Topics    Alcohol use: No    Drug use: No     Review of Systems   Constitutional: Negative.    HENT: Negative.     Eyes: Negative.    Respiratory: Negative.     Cardiovascular: Negative.    Gastrointestinal:  Positive for anal bleeding, blood in stool, hematochezia and hemorrhoids.   Endocrine: Negative.    Genitourinary: Negative.    Musculoskeletal: Negative.    Skin: Negative.    Allergic/Immunologic: Negative.    Neurological: Negative.    Psychiatric/Behavioral: Negative.         Physical Exam     Initial Vitals [07/28/24 1209]   BP Pulse Resp Temp SpO2   (!) 142/75 97 18 98.1 °F (36.7 °C) 97 %      MAP       --         Physical Exam    Nursing note and vitals reviewed.  Constitutional: She appears well-developed and well-nourished.   HENT:   Head: Normocephalic.   Neck:   Normal range of motion.  Cardiovascular:  Normal rate and normal heart sounds.           Pulmonary/Chest: Breath sounds normal.   Abdominal: Bowel sounds are normal.   Genitourinary:    Genitourinary Comments: Patient has exposed internal hemorrhoid approximately at 7:00 that is not bleeding at this time but has a small clot on the surface     Musculoskeletal:      Cervical back: Normal range of motion.     Neurological: She is alert. GCS  score is 15. GCS eye subscore is 4. GCS verbal subscore is 5. GCS motor subscore is 6.   Skin: Skin is warm.         ED Course   Procedures  Labs Reviewed   CBC W/ AUTO DIFFERENTIAL - Abnormal       Result Value    WBC 10.39      RBC 4.51      Hemoglobin 13.7      Hematocrit 40.5      MCV 90      MCH 30.4      MCHC 33.8      RDW 12.9      Platelets 268      MPV 9.0 (*)     Immature Granulocytes 0.3      Gran # (ANC) 6.3      Immature Grans (Abs) 0.03      Lymph # 3.1      Mono # 0.8      Eos # 0.1      Baso # 0.05      nRBC 0      Gran % 61.0      Lymph % 29.8      Mono % 7.2      Eosinophil % 1.2      Basophil % 0.5      Differential Method Automated     COMPREHENSIVE METABOLIC PANEL    Sodium 140      Potassium 4.0      Chloride 104      CO2 24      Glucose 103      BUN 8      Creatinine 0.9      Calcium 10.0      Total Protein 7.4      Albumin 4.1      Total Bilirubin 0.6      Alkaline Phosphatase 82      AST 17      ALT 12      eGFR >60      Anion Gap 12     PROTIME-INR    Prothrombin Time 10.8      INR 1.0            Imaging Results    None          Medications - No data to display  Medical Decision Making  Bleeding hemorrhoids internal which showed stopped command surgical consult as outpatient    Amount and/or Complexity of Data Reviewed  Labs: ordered.    Risk  Prescription drug management.                                      Clinical Impression:  Final diagnoses:  [K62.5] Rectal bleeding (Primary)  [K64.8] Bleeding internal hemorrhoids          ED Disposition Condition    Discharge Stable          ED Prescriptions       Medication Sig Dispense Start Date End Date Auth. Provider    hydrocortisone-pramoxine (PROCTOFOAM-HS) rectal foam Place 1 applicator rectally 2 (two) times daily. for 5 days 10 applicator 7/28/2024 8/2/2024 GARY Tuttle III, MD          Follow-up Information       Follow up With Specialties Details Why Contact Info    Yang Ham MD General Surgery Schedule an appointment as  soon as possible for a visit in 3 days Hemorrhoids [address] [place] [id number]  [phone]               GARY Tuttle III, MD  07/28/24 7196

## 2024-08-01 ENCOUNTER — TELEPHONE (OUTPATIENT)
Dept: EMERGENCY MEDICINE | Facility: HOSPITAL | Age: 64
End: 2024-08-01
Payer: MEDICARE

## 2024-08-01 RX ORDER — HYDROCORTISONE ACETATE 25 MG/1
25 SUPPOSITORY RECTAL 2 TIMES DAILY
Qty: 20 SUPPOSITORY | Refills: 0 | Status: SHIPPED | OUTPATIENT
Start: 2024-08-01 | End: 2024-08-11

## 2024-12-30 ENCOUNTER — HOSPITAL ENCOUNTER (OUTPATIENT)
Dept: RADIOLOGY | Facility: HOSPITAL | Age: 64
Discharge: HOME | End: 2024-12-30
Attending: FAMILY MEDICINE
Payer: MEDICARE

## 2024-12-30 DIAGNOSIS — R91.1 SOLITARY LUNG NODULE: ICD-10-CM

## 2024-12-30 PROCEDURE — 71250 CT THORAX DX C-: CPT | Mod: TC

## 2024-12-30 PROCEDURE — 71250 CT THORAX DX C-: CPT | Mod: 26,,, | Performed by: RADIOLOGY

## 2025-04-08 ENCOUNTER — TELEPHONE (OUTPATIENT)
Dept: SURGERY | Facility: CLINIC | Age: 65
End: 2025-04-08
Payer: MEDICARE

## 2025-04-08 NOTE — TELEPHONE ENCOUNTER
Spoke with pt regarding referral for colonoscopy in [place], informed her my Nurse would put in the correct orders and someone from [place] should call her to have it scheduled. Pt verbalized understanding.

## 2025-04-15 ENCOUNTER — TELEPHONE (OUTPATIENT)
Dept: SURGERY | Facility: CLINIC | Age: 65
End: 2025-04-15
Payer: MEDICARE

## 2025-04-15 NOTE — TELEPHONE ENCOUNTER
Appointment scheduled in [place] for a clinic visit with Dr. Narayan, informed pt that someone will be calling her soon to schedule colonoscopy. Appointment slip mailed to pt.

## 2025-04-15 NOTE — TELEPHONE ENCOUNTER
----- Message from LACEY Faye sent at 4/14/2025 12:26 PM CDT -----  Contact: pt @[phone]  Antonino Spencer,Please call back and tell her that Nurse Miranda will call her from [facility] to get her scheduled for her colonoscopy within the next 1-2 weeks.  They are booked up until June/July.  No appt needed beforehand.  I know you called her, but she is calling back again :/Thank you!Darryl  ----- Message -----  From: Nakia Anderson  Sent: 4/14/2025  12:11 PM CDT  To: Sylvain Bailey Staff    Melanie Melancon calling regarding Appointment Access  (message) for #pt is calling to get appt for colonoscopy, but pt will like to be seen in [place] with doctor Dr. Narayan , asking for call back

## 2025-05-21 NOTE — PROGRESS NOTES
[facility]  Colon and Rectal Surgery    [address]  [place], [place]  Tel: [phone]  Fax: [phone]  [email or web address]   MD Lopez Yao MD Brian Kann, MD W. Forrest Johnston, MD Matthew Zelhart, MD Jennifer Paruch, MD William Kethman, MD Danielle Kay, MD Steven Schuetz, MD     Patient name: Melanie Armenta   YOB: 1960   MRN: [id number]    It was a pleasure seeing Ms. Armenta in the Colon and Rectal Surgery clinic here at [facility].     As you know, Ms. Armenta is a 64 year old woman with a history of GERD, HTN, TIA, depression, cervical cancer (hysterectomy - no radiation), RA, and epilepsy who presents for evaluation of hemorrhoidal disease. She has intermittent quite severe rectal bleeding - with intermittent spotting. She has been diagnosed with internal and external hemorrhoids - she has used hydrocortisone cream. She is not bleeding right now. She has bowel movements about 5 times per day - mostly liquid. She does strain when she goes to the bathroom. She smokes tobacco. She denies family history of IBD or CRC. She does have epigastric pain previously worked up - was positive and treated for H. Pylori but without follow-up.    Cologuard - 8/2022 - normal    Wt Readings from Last 3 Encounters:   05/22/25 58.1 kg (128 lb 1.4 oz)   07/28/24 56.7 kg (125 lb)   12/18/23 55.8 kg (123 lb 0.3 oz)     The patient was informed of the availability of a certified  without charge. A certified  was not necessary for this visit.    Review of Systems  See pertinent review of systems above    Past Medical History:   Diagnosis Date    Alopecia     Anxiety     Arthritis     Bacterial gastroenteritis     Cancer     cervix    Cataract     bilateral     Cervical disc disease     COVID-19     Depression     Diverticulosis     Dizziness     Dry eyes     Epilepsy     GERD (gastroesophageal reflux disease)      "Headache     History of fibromatosis     Hypertension     Hypoxemia     Insomnia     Liver cyst     Malaise and fatigue     Other chronic allergic conjunctivitis     Palpitations     Polyarthropathy     Renal disorder     Restless leg     Rheumatoid arthritis, unspecified     Seizures     Sleep apnea     TIA (transient ischemic attack)     Unspecified osteoarthritis, unspecified site      Past Surgical History:   Procedure Laterality Date    APPENDECTOMY      cryopreservation ovary      ESOPHAGOGASTRODUODENOSCOPY N/A 5/5/2023    Procedure: EGD (ESOPHAGOGASTRODUODENOSCOPY);  Surgeon: Lopez Shukla MD;  [facility]: [facility];  Service: Endoscopy;  Laterality: N/A;    EYE SURGERY Right     abscess    HYSTERECTOMY      OVARIAN CYST SURGERY       Family History   Problem Relation Name Age of Onset    COPD Mother      Stroke Father       Social History[1]  Review of patient's allergies indicates:   Allergen Reactions    Aspirin     Azithromycin     Cephalexin Other (See Comments)    Clonazepam Other (See Comments)    Gabapentin     Hydrocodone-acetaminophen Other (See Comments)    Pcn [penicillins]     Pramipexole     Ropinirole Other (See Comments) and Hallucinations    Sertraline     Sulfa (sulfonamide antibiotics)     Tramadol-acetaminophen     Ciprofloxacin Nausea Only       Medications Ordered Prior to Encounter[2]    Physical Examination  /60   Pulse 76   Resp 16   Ht 5' 3" (1.6 m)   Wt 58.1 kg (128 lb 1.4 oz)   BMI 22.69 kg/m²      A chaperone was present for the physical examination.    Constitutional: well developed, no cough, no dyspnea, alert, and no acute distress    Head: Normocephalic, no lesions, without obvious abnormality  Eye: Normal external eye, conjunctiva, and lids  Cardiovascular: regular rate and regular rhythm  Respiratory: normal air entry  Gastrointestinal: soft, non-tender    Perianal Skin: Normal  Digital Rectal Exam:  Normal resting tone  Normal squeeze pressure   Relaxation " with bear down is present  Mass(es) appreciated: none    Musculoskeletal: full range of motion without pain  Neurologic: alert, oriented, normal speech, no focal findings or movement disorder noted  Psychiatric: appropriate, normal mood    Deferred anoscopy due to discomfort with exam and need for colonoscopy    Assessment and Plan of Care    Thank you again for referring Ms. Armenta to my care. In summary, Ms. Armenta is a 64 year old woman presenting with rectal bleeding. We discussed treatment options and have provided the following recommendations:    We had a discussion about conservative management options for symptoms related to hemorrhoids. Irritation or itching can be treated with Lidocaine cream/ointment, over-the-counter Hydrocortisone suppositories or ointments, and warm baths or soaking. Of note hydrocortisone, should not be used for longer than a week due to side effects. Bleeding often improves by reducing hard stools and straining - Fiber supplementation (Metamucil 20-30 grams daily) and hydration (1.5 - 2 L daily) are effective. If you suffer from constipation, Miralax can be taken as a stool softener. Regular exercise, avoiding constipating medications (narcotic pain medications), limiting alcohol and fatty foods, reduced time on the toilet and focusing on not straining can also be effective.  Recommend colonoscopy - will take random biopsies to rule out, possible banding    Recommend tobacco cessation - offered referral  Recommend follow-up with Gastroenterology for continue workup of persistent symptoms      Please do not hesitate to contact me if you have any questions.      Leo Narayan MD, FACS, FASCRS  Department of Colon & Rectal Surgery  [facility]         [1]   Social History  Tobacco Use    Smoking status: Every Day     Current packs/day: 2.00     Types: Cigarettes   Substance Use Topics    Alcohol use: No    Drug use: No   [2]   Current Outpatient Medications on File Prior to  Visit   Medication Sig Dispense Refill    amLODIPine (NORVASC) 5 MG tablet Take 5 mg by mouth every evening.      carboxymethylcellulose (RETAINE CMC) 0.5 % Dpet 1 drop 3 (three) times daily as needed.      clopidogrel (PLAVIX) 75 mg tablet Take 75 mg by mouth once daily.      cycloSPORINE (RESTASIS) 0.05 % ophthalmic emulsion Place 1 drop into both eyes 2 (two) times daily.      doxycycline (VIBRAMYCIN) 100 MG Cap Take 100 mg by mouth once daily.      erythromycin (ROMYCIN) ophthalmic ointment Place 1 cm into both eyes 3 (three) times daily.      estradioL (ESTRACE) 0.01 % (0.1 mg/gram) vaginal cream Place 1 g vaginally twice a week. 8 g 11    fexofenadine (ALLEGRA) 60 MG tablet Take 60 mg by mouth once daily.      hydrOXYzine pamoate (VISTARIL) 25 MG Cap Take 25 mg by mouth once daily.       Lactobacillus 3/FOS/pantethine (PROBIOTIC AND ACIDOPHILUS ORAL) Take 1 tablet by mouth once daily.      levoFLOXacin (LEVAQUIN) 250 MG tablet Take 1 tablet (250 mg total) by mouth once daily. 7 tablet 0    lisinopriL (PRINIVIL,ZESTRIL) 40 MG tablet Take 40 mg by mouth once daily.      metoprolol succinate (TOPROL-XL) 25 MG 24 hr tablet Take 50 mg by mouth daily as needed.      ondansetron (ZOFRAN-ODT) 4 MG TbDL Take 1 tablet (4 mg total) by mouth every 8 (eight) hours as needed (nausea). (Patient not taking: Reported on 12/18/2023) 20 tablet 0    pantoprazole (PROTONIX) 40 MG tablet Take 40 mg by mouth once daily.      pravastatin (PRAVACHOL) 20 MG tablet Take 20 mg by mouth once daily.       Current Facility-Administered Medications on File Prior to Visit   Medication Dose Route Frequency Provider Last Rate Last Admin    0.9%  NaCl infusion   Intravenous Continuous Lopez Shukla MD   Stopped at 05/05/23 1860

## 2025-05-22 ENCOUNTER — OFFICE VISIT (OUTPATIENT)
Facility: CLINIC | Age: 65
End: 2025-05-22
Payer: MEDICARE

## 2025-05-22 VITALS
HEART RATE: 76 BPM | DIASTOLIC BLOOD PRESSURE: 60 MMHG | RESPIRATION RATE: 16 BRPM | HEIGHT: 63 IN | WEIGHT: 128.06 LBS | BODY MASS INDEX: 22.69 KG/M2 | SYSTOLIC BLOOD PRESSURE: 110 MMHG

## 2025-05-22 DIAGNOSIS — K62.5 RECTAL BLEEDING: Primary | ICD-10-CM

## 2025-05-22 DIAGNOSIS — Z72.0 TOBACCO ABUSE: ICD-10-CM

## 2025-05-22 PROCEDURE — 99204 OFFICE O/P NEW MOD 45 MIN: CPT | Mod: S$GLB,,, | Performed by: STUDENT IN AN ORGANIZED HEALTH CARE EDUCATION/TRAINING PROGRAM

## 2025-05-22 PROCEDURE — 4010F ACE/ARB THERAPY RXD/TAKEN: CPT | Mod: CPTII,S$GLB,, | Performed by: STUDENT IN AN ORGANIZED HEALTH CARE EDUCATION/TRAINING PROGRAM

## 2025-05-22 PROCEDURE — 3078F DIAST BP <80 MM HG: CPT | Mod: CPTII,S$GLB,, | Performed by: STUDENT IN AN ORGANIZED HEALTH CARE EDUCATION/TRAINING PROGRAM

## 2025-05-22 PROCEDURE — 3008F BODY MASS INDEX DOCD: CPT | Mod: CPTII,S$GLB,, | Performed by: STUDENT IN AN ORGANIZED HEALTH CARE EDUCATION/TRAINING PROGRAM

## 2025-05-22 PROCEDURE — 3074F SYST BP LT 130 MM HG: CPT | Mod: CPTII,S$GLB,, | Performed by: STUDENT IN AN ORGANIZED HEALTH CARE EDUCATION/TRAINING PROGRAM

## 2025-05-22 PROCEDURE — 99999 PR PBB SHADOW E&M-EST. PATIENT-LVL III: CPT | Mod: PBBFAC,,, | Performed by: STUDENT IN AN ORGANIZED HEALTH CARE EDUCATION/TRAINING PROGRAM

## 2025-05-22 PROCEDURE — 1126F AMNT PAIN NOTED NONE PRSNT: CPT | Mod: CPTII,S$GLB,, | Performed by: STUDENT IN AN ORGANIZED HEALTH CARE EDUCATION/TRAINING PROGRAM

## 2025-07-14 ENCOUNTER — TELEPHONE (OUTPATIENT)
Dept: NEUROLOGY | Facility: CLINIC | Age: 65
End: 2025-07-14
Payer: MEDICARE

## 2025-07-14 NOTE — TELEPHONE ENCOUNTER
She is low risk to hold Plavis for 5 days prior to Colonoscopy.  Gabe Collins MD    ===View-only below this line===  ----- Message -----  From: Pricilla Osei RN  Sent: 7/14/2025   3:34 PM CDT  To: Gabe Collins MD  Subject: Plavix hold                                      Good afternoon,  Ms. Armenta is scheduled for a colonoscopy on 7/31/25 with Dr. Narayan here at [place].  She takes daily Plavix. Per the patient, this is due to her neuro history and it is prescribed by you.  Can you authorize an OK to hold for 5 days for her scope?    Thank you,  Pricilla Osei RN

## 2025-07-17 ENCOUNTER — TELEPHONE (OUTPATIENT)
Dept: PREADMISSION TESTING | Facility: HOSPITAL | Age: 65
End: 2025-07-17
Payer: MEDICARE

## 2025-07-17 ENCOUNTER — HOSPITAL ENCOUNTER (OUTPATIENT)
Dept: PREADMISSION TESTING | Facility: HOSPITAL | Age: 65
Discharge: HOME | End: 2025-07-17
Attending: STUDENT IN AN ORGANIZED HEALTH CARE EDUCATION/TRAINING PROGRAM
Payer: MEDICARE

## 2025-07-17 RX ORDER — DIAZEPAM 10 MG/1
10 TABLET ORAL 2 TIMES DAILY
COMMUNITY

## 2025-07-17 NOTE — DISCHARGE INSTRUCTIONS
Colonoscopy Prep Instructions      Date: Thursday 7/31/25  Arrival time:       IMPORTANT: PLEASE READ YOUR INSTRUCTIONS CAREFULLY. FAILURE TO FOLLOW THESE INSTRUCTIONS MAY RESULT IN YOUR PROCEURE BEING CANCELED,RESCHEDULED, OR REPEATED.    Starting one week before your colonoscopy: (7/24/25)--start eating a low/no fiber diet.  Avoid nuts, corn, seeds, seeded veggies, beans, raw veggies, and meats that are harder to digest.  Poultry and fish are preferable to beef and pork.  Make sure your veggies are cooked well.  Breads/rice/noodles--all white is better.  MAKE SURE YOU ARE HYDRATING WELL!.                                         The day before your procedure: Wednesday (7/30/25)  Upon awakening begin the clear liquid diet. DO NOT EAT SOLID FOODS     Wednesday morning mix the entire container of prep.  Start  with lukewarm water to mix the powder, then fill the jug to the top with any qualifying clear liquid of your choice.  You can refrigerate if you choose, as it is commonly preferred chilled.  You can also use a straw to consume to help with minimizing the taste of the medication.      Clear Liquid Diet---- you may have any of the following:     Water, tea, coffee (decaffeinated or regular)     Soft drinks (regular or sugar free)     Gelatin dessert (plain or flavored)     Juice, Gatorade, Powerade, Crystal Lite, lemonade, limeade, Dereck-Aid     Bouillon, clear consommé, 100% fat free beef, chicken, or vegetable broths     Snowballs, popsicles     100% cranberry juice is the only red liquid allowed     Please Avoid the following:     Anything with RED dye     Liquids not specifically listed     Dairy (liquid and powder)     Creamers (liquid and powder)     Alcohol             At __________--take the 4 Dulcolax laxatives, follow with a bottle of water. Typically, after 2-3 hours, you will have 1-2 large bowel movements.      Drink at least 6-8 glasses of clear liquids until you begin your prep       At __________--  : Drink half of the prep within 2 hour.  Pour yourself one cup every 10-15 minutes until half of the jug remains.                         Refrigerate the remaining half of the prep     When you are done with your prep, you can continue to have clear liquids through the night.          Thursday morning, four hours before your arrival time, at __________________ am--    Drink second half of the prep within 2 hour.  Pour yourself one cup every 10-15 minutes until half of the jug remains.                                                Once your prep is complete, you can should stop drinking.  You should be done by no later than _____________am.      Before arrival, with a sip of water, take the following medications. ___________________________      YOU MUST HAVE A  HOME!

## 2025-07-31 ENCOUNTER — HOSPITAL ENCOUNTER (OUTPATIENT)
Dept: ENDOSCOPY | Facility: HOSPITAL | Age: 65
Discharge: HOME | End: 2025-07-31
Attending: STUDENT IN AN ORGANIZED HEALTH CARE EDUCATION/TRAINING PROGRAM
Payer: MEDICARE

## 2025-07-31 ENCOUNTER — ANESTHESIA (OUTPATIENT)
Dept: ENDOSCOPY | Facility: HOSPITAL | Age: 65
End: 2025-07-31
Payer: MEDICARE

## 2025-07-31 ENCOUNTER — ANESTHESIA EVENT (OUTPATIENT)
Dept: ENDOSCOPY | Facility: HOSPITAL | Age: 65
End: 2025-07-31
Payer: MEDICARE

## 2025-07-31 VITALS
OXYGEN SATURATION: 94 % | TEMPERATURE: 98 F | DIASTOLIC BLOOD PRESSURE: 57 MMHG | RESPIRATION RATE: 18 BRPM | SYSTOLIC BLOOD PRESSURE: 121 MMHG | HEART RATE: 73 BPM

## 2025-07-31 DIAGNOSIS — K62.5 RECTAL BLEEDING: ICD-10-CM

## 2025-07-31 PROCEDURE — 63600175 PHARM REV CODE 636 W HCPCS: Performed by: NURSE ANESTHETIST, CERTIFIED REGISTERED

## 2025-07-31 PROCEDURE — 37000008 HC ANESTHESIA 1ST 15 MINUTES

## 2025-07-31 PROCEDURE — 37000009 HC ANESTHESIA EA ADD 15 MINS

## 2025-07-31 RX ORDER — PROPOFOL 10 MG/ML
INJECTION, EMULSION INTRAVENOUS
Status: DISCONTINUED | OUTPATIENT
Start: 2025-07-31 | End: 2025-07-31

## 2025-07-31 RX ORDER — SODIUM CHLORIDE, SODIUM LACTATE, POTASSIUM CHLORIDE, CALCIUM CHLORIDE 600; 310; 30; 20 MG/100ML; MG/100ML; MG/100ML; MG/100ML
INJECTION, SOLUTION INTRAVENOUS CONTINUOUS PRN
Status: DISCONTINUED | OUTPATIENT
Start: 2025-07-31 | End: 2025-07-31

## 2025-07-31 RX ORDER — LIDOCAINE HYDROCHLORIDE 20 MG/ML
INJECTION INTRAVENOUS
Status: DISCONTINUED | OUTPATIENT
Start: 2025-07-31 | End: 2025-07-31

## 2025-07-31 RX ADMIN — PROPOFOL 30 MG: 10 INJECTION, EMULSION INTRAVENOUS at 11:07

## 2025-07-31 RX ADMIN — PROPOFOL 30 MG: 10 INJECTION, EMULSION INTRAVENOUS at 10:07

## 2025-07-31 RX ADMIN — PROPOFOL 120 MG: 10 INJECTION, EMULSION INTRAVENOUS at 10:07

## 2025-07-31 RX ADMIN — SODIUM CHLORIDE, SODIUM LACTATE, POTASSIUM CHLORIDE, AND CALCIUM CHLORIDE: .6; .31; .03; .02 INJECTION, SOLUTION INTRAVENOUS at 10:07

## 2025-07-31 RX ADMIN — LIDOCAINE HYDROCHLORIDE 60 MG: 20 INJECTION, SOLUTION INTRAVENOUS at 10:07

## 2025-07-31 NOTE — ANESTHESIA POSTPROCEDURE EVALUATION
Anesthesia Post Evaluation    Patient: Melanie Armenta    Procedure(s) Performed: * No procedures listed *    Final Anesthesia Type: general      Patient location during evaluation: PACU  Patient participation: Yes- Able to Participate  Level of consciousness: awake and alert, oriented and awake  Post-procedure vital signs: reviewed and stable  Pain management: adequate  Airway patency: patent    PONV status at discharge: No PONV  Anesthetic complications: no      Cardiovascular status: blood pressure returned to baseline, hemodynamically stable and stable  Respiratory status: unassisted, spontaneous ventilation and room air  Hydration status: euvolemic  Follow-up not needed.              Vitals Value Taken Time   /61 07/31/25 12:17   Temp 37 07/31/25 12:31   Pulse 69 07/31/25 12:20   Resp 20 07/31/25 12:20   SpO2 93 % 07/31/25 12:20   Vitals shown include unfiled device data.      No case tracking events are documented in the log.      Pain/Rere Score: Rere Score: 9 (7/31/2025 12:08 PM)

## 2025-07-31 NOTE — H&P
[facility]  Colon and Rectal Surgery    [address]  [place], [place]  Tel: [phone]  Fax: [phone]  [email or web address]   MD Lopez Yao MD Brian Kann, MD W. Forrest Johnston, MD Matthew Zelhart, MD Jennifer Paruch, MD William Kethman, MD Danielle Kay, MD Steven Schuetz, MD     Patient name: Melanie Armenta   YOB: 1960   MRN: [id number]  Date of procedure: 07/31/2025    Procedure: Colonoscopy  Indications: Screening for colon cancer, Rectal bleeding, and No family history of colorectal cancer    No history of colonoscopy    The patient was informed of the availability of a certified  without charge. A certified  was not necessary for this visit.    Sedation plan: MAC  ASA: ASA 2 - Patient with mild systemic disease with no functional limitations    Review of Systems  See above    Past Medical History:   Diagnosis Date    Alopecia     Anxiety     Arthritis     Bacterial gastroenteritis     Cancer     cervix    Cataract     bilateral     Cervical disc disease     COVID-19     Depression     Diverticulosis     Dizziness     Dry eyes     Epilepsy     GERD (gastroesophageal reflux disease)     Headache     History of fibromatosis     Hypertension     Hypoxemia     Insomnia     Liver cyst     Malaise and fatigue     Other chronic allergic conjunctivitis     Palpitations     Polyarthropathy     Renal disorder     Restless leg     Rheumatoid arthritis, unspecified     Seizures     Sleep apnea     TIA (transient ischemic attack)     Unspecified osteoarthritis, unspecified site      Past Surgical History:   Procedure Laterality Date    APPENDECTOMY      cryopreservation ovary      ESOPHAGOGASTRODUODENOSCOPY N/A 5/5/2023    Procedure: EGD (ESOPHAGOGASTRODUODENOSCOPY);  Surgeon: Lopez Shukla MD;  Location: [facility];  Service: Endoscopy;  Laterality: N/A;    EYE SURGERY Right     abscess    HYSTERECTOMY       OVARIAN CYST SURGERY       Family History   Problem Relation Name Age of Onset    COPD Mother      Stroke Father       Social History[1]  Review of patient's allergies indicates:   Allergen Reactions    Aspirin     Azithromycin     Cephalexin Other (See Comments)    Clonazepam Other (See Comments)    Gabapentin     Hydrocodone-acetaminophen Other (See Comments)    Pcn [penicillins]     Pramipexole     Ropinirole Other (See Comments) and Hallucinations    Sertraline     Sulfa (sulfonamide antibiotics)     Tramadol-acetaminophen     Ciprofloxacin Nausea Only       Prior to Admission medications    Medication Sig Start Date End Date Taking? Authorizing Provider   amLODIPine (NORVASC) 5 MG tablet Take 5 mg by mouth every evening.   Yes Provider, Historical   clopidogrel (PLAVIX) 75 mg tablet Take 75 mg by mouth once daily.   Yes Provider, Historical   cycloSPORINE (RESTASIS) 0.05 % ophthalmic emulsion Place 1 drop into both eyes 2 (two) times daily.   Yes Provider, Historical   diazePAM (VALIUM) 10 MG Tab Take 10 mg by mouth 2 (two) times a day.   Yes Provider, Historical   doxycycline (VIBRAMYCIN) 100 MG Cap Take 100 mg by mouth once daily.   Yes Provider, Historical   erythromycin (ROMYCIN) ophthalmic ointment Place 1 cm into both eyes 3 (three) times daily.   Yes Provider, Historical   fexofenadine (ALLEGRA) 60 MG tablet Take 60 mg by mouth once daily.   Yes Provider, Historical   hydrOXYzine pamoate (VISTARIL) 25 MG Cap Take 25 mg by mouth once daily.    Yes Provider, Historical   lisinopriL (PRINIVIL,ZESTRIL) 40 MG tablet Take 40 mg by mouth once daily.   Yes Provider, Historical   metoprolol succinate (TOPROL-XL) 25 MG 24 hr tablet Take 50 mg by mouth 2 (two) times daily.   Yes Provider, Historical   pantoprazole (PROTONIX) 40 MG tablet Take 40 mg by mouth once daily.   Yes Provider, Historical   pravastatin (PRAVACHOL) 20 MG tablet Take 20 mg by mouth once daily.   Yes Provider, Historical   bisacodyL  (DULCOLAX) 5 mg EC tablet Take 4 tablets (20 mg total) by mouth once. for 1 dose 7/30/25 7/30/25  Leo Narayan MD   carboxymethylcellulose (RETAINE CMC) 0.5 % Dpet 1 drop 3 (three) times daily as needed.    Provider, Historical   polyethylene glycol (GOLYTELY) 236-22.74-6.74 -5.86 gram suspension Take 4,000 mLs by mouth once. for 1 dose 7/30/25 7/30/25  Leo Narayan MD       Physical Examination  Breastfeeding No      Constitutional: well developed, no cough, no dyspnea, alert, and no acute distress    Head: Normocephalic, no lesions, without obvious abnormality  Eye: Normal external eye, conjunctiva, and lids, PERRL  Cardiovascular: regular rate and regular rhythm  Respiratory: normal air entry  Gastrointestinal: soft, non-tender, without masses or organomegaly  Neurologic: alert, oriented, normal speech, no focal findings or movement disorder noted  Psychiatric: appropriate, normal mood    Plan of Care    It was a pleasure meeting Ms. Armenta today - we will plan to perform a colonoscopy with monitored anesthesia care. The details of the procedure, the possible need for biopsy or polypectomy and the potential risks including bleeding, perforation, missed polyps were discussed in detail and they consented to undergo the procedure.      Leo Narayan MD, FACS, FASCRS  Department of Colon & Rectal Surgery  [facility]         [1]   Social History  Tobacco Use    Smoking status: Every Day     Current packs/day: 1.00     Average packs/day: 1 pack/day for 49.6 years (49.6 ttl pk-yrs)     Types: Cigarettes     Start date: 1976   Substance Use Topics    Alcohol use: No    Drug use: No

## 2025-07-31 NOTE — ANESTHESIA PREPROCEDURE EVALUATION
07/31/2025  Melanie Armenta is a 64 y.o., female.      Pre-op Assessment    I have reviewed the Patient Summary Reports.     I have reviewed the Nursing Notes. I have reviewed the NPO Status.   I have reviewed the Medications.     Review of Systems  Anesthesia Hx:  No problems with previous Anesthesia                Social:  Smoker       Cardiovascular:     Hypertension                                          Pulmonary:        Sleep Apnea                Hepatic/GI:     GERD                Musculoskeletal:  Arthritis          Spine Disorders: cervical Disc disease           Neurological:  TIA,    Headaches Seizures                                Psych:  Psychiatric History anxiety depression                Physical Exam  General: Well nourished, Cooperative and Alert    Airway:  Mallampati: II   Neck ROM: Normal ROM    Chest/Lungs:  Clear to auscultation, Normal Respiratory Rate    Heart:  Rate: Normal  Rhythm: Regular Rhythm  Sounds: Normal        Anesthesia Plan  Type of Anesthesia, risks & benefits discussed:    Anesthesia Type: MAC  Intra-op Monitoring Plan: Standard ASA Monitors  Induction:  IV  Informed Consent: Informed consent signed with the Patient and all parties understand the risks and agree with anesthesia plan.  All questions answered.   ASA Score: 3  Day of Surgery Review of History & Physical: H&P Update referred to the surgeon/provider.I have interviewed and examined the patient. I have reviewed the patient's H&P dated: 7/31/25. There are no significant changes.     Ready For Surgery From Anesthesia Perspective.     .

## 2025-07-31 NOTE — DISCHARGE SUMMARY
Brief Operative Note    Date of surgery: 07/31/2025    Pre-operative Diagnosis:  Screening for colon cancer     Post-operative Diagnosis:   Same as above    Surgeon: Leo Narayan M.D.    Procedure:  Colonoscopy     Anesthesia: Monitored anesthesia care     Findings:  See operative note    Estimated blood loss: Minimal         Specimens:   See operative note    Discharge Note    Outcome:   Patient tolerated treatment/procedure well without complication and is now ready for discharge.    Disposition:   Home or Self Care    Final diagnosis:    Screening for colon cancer    Follow-up:   With primary care provider    Discharge Procedure Orders   Diet Adult Regular     No dressing needed     Notify your health care provider if you experience any of the following:  temperature >100.4     Notify your health care provider if you experience any of the following:  persistent nausea and vomiting or diarrhea     Notify your health care provider if you experience any of the following:  severe uncontrolled pain     Notify your health care provider if you experience any of the following:  redness, tenderness, or signs of infection (pain, swelling, redness, odor or green/yellow discharge around incision site)     Notify your health care provider if you experience any of the following:  difficulty breathing or increased cough     Notify your health care provider if you experience any of the following:  severe persistent headache     Notify your health care provider if you experience any of the following:  worsening rash     Notify your health care provider if you experience any of the following:  persistent dizziness, light-headedness, or visual disturbances     Notify your health care provider if you experience any of the following:  increased confusion or weakness     Activity as tolerated            Leo Narayan MD

## 2025-07-31 NOTE — TRANSFER OF CARE
Anesthesia Transfer of Care Note    Patient: Melanie Armenta    Procedure(s) Performed: * No procedures listed *    Patient location: PACU    Anesthesia Type: general    Transport from OR: Transported from OR on 6-10 L/min O2 by face mask with adequate spontaneous ventilation    Post pain: adequate analgesia    Post assessment: no apparent anesthetic complications and tolerated procedure well    Post vital signs: stable    Level of consciousness: sedated    Nausea/Vomiting: no nausea/vomiting    Complications: none    Transfer of care protocol was followed      Last vitals: Visit Vitals  BP (!) 114/54 (BP Location: Left arm, Patient Position: Lying)   Pulse 78   Temp 36.5 °C (97.7 °F)   Resp 16   SpO2 96%   Breastfeeding No

## 2025-08-01 ENCOUNTER — TELEPHONE (OUTPATIENT)
Facility: CLINIC | Age: 65
End: 2025-08-01
Payer: MEDICARE

## 2025-08-01 NOTE — TELEPHONE ENCOUNTER
RN called pt back.  Pt did not understand what Dr. Narayan told her after her procedure on yesterday.  She would like for him to give her a call at his earliest convenience.  RN let pt know that MD has sx cases all afternoon and he will call as soon as he can.  Pt verbalized understanding to all.

## 2025-08-06 ENCOUNTER — TELEPHONE (OUTPATIENT)
Dept: SURGERY | Facility: CLINIC | Age: 65
End: 2025-08-06
Payer: MEDICARE

## 2025-08-06 NOTE — TELEPHONE ENCOUNTER
Called and discussed colonoscopy again    Recommend smoking cessation, fiber supplementation  Will follow-up in 2 clinic in 2 months and consider banding depending on her symptoms      Leo Narayan MD, FACS, FASCRS  Department of Colon & Rectal Surgery  [facility]